# Patient Record
Sex: MALE | Race: WHITE | NOT HISPANIC OR LATINO | Employment: FULL TIME | URBAN - METROPOLITAN AREA
[De-identification: names, ages, dates, MRNs, and addresses within clinical notes are randomized per-mention and may not be internally consistent; named-entity substitution may affect disease eponyms.]

---

## 2018-04-29 ENCOUNTER — OFFICE VISIT (OUTPATIENT)
Dept: URGENT CARE | Facility: CLINIC | Age: 38
End: 2018-04-29

## 2018-04-29 VITALS
SYSTOLIC BLOOD PRESSURE: 122 MMHG | DIASTOLIC BLOOD PRESSURE: 84 MMHG | BODY MASS INDEX: 38.08 KG/M2 | RESPIRATION RATE: 18 BRPM | HEART RATE: 65 BPM | WEIGHT: 266 LBS | OXYGEN SATURATION: 96 % | TEMPERATURE: 97.7 F | HEIGHT: 70 IN
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
## 2018-04-29 PROCEDURE — G0382 LEV 3 HOSP TYPE B ED VISIT: HCPCS | Performed by: PHYSICIAN ASSISTANT

## 2018-04-29 RX ORDER — DOXYCYCLINE 100 MG/1
200 TABLET ORAL ONCE
Qty: 2 TABLET | Refills: 0 | Status: SHIPPED | OUTPATIENT
Start: 2018-04-29 | End: 2018-04-29

## 2018-04-29 NOTE — PROGRESS NOTES
[address] [facility]        NAME: Myrna Strickland is a 44 y o  male  : 1980    MRN: [id number]  DATE: 2018  TIME: 2:54 PM    Assessment and Plan   Tick bite, initial encounter [W57  XXXA]  1  Tick bite, initial encounter  doxycycline (ADOXA) 100 MG tablet         Patient Instructions     1  Tick bite  -Tick was removed prior to arrival  -Take doxycyline as directed for prevention of Lyme disease  -Follow-up with PCP within 1 week or if develop rash, fever, joint pain, headache, or any new concerns    Chief Complaint     Chief Complaint   Patient presents with    Tick Removal     x 1 day, small red area         History of Present Illness       The patient presents today for an evaluation of a tick bite  The patient feels that it bit him yesterday while working in the yard  The patient states that he noticed the tick this morning and the patient states that his wife was able to remove the entire tick  The patient states that the area is "sore "        Review of Systems   Review of Systems   Constitutional: Negative for fever  Skin: Positive for wound  Neurological: Negative for numbness  Current Medications       Current Outpatient Prescriptions:     doxycycline (ADOXA) 100 MG tablet, Take 2 tablets (200 mg total) by mouth once for 1 dose, Disp: 2 tablet, Rfl: 0    Current Allergies     Allergies as of 2018 - Reviewed 2018   Allergen Reaction Noted    Penicillins  2013            The following portions of the patient's history were reviewed and updated as appropriate: allergies, current medications, past family history, past medical history, past social history, past surgical history and problem list      History reviewed  No pertinent past medical history  History reviewed  No pertinent surgical history      Family History   Problem Relation Age of Onset    No Known Problems Mother     No Known Problems Father          Medications have been verified  Objective   /84 (BP Location: Left arm, Patient Position: Sitting, Cuff Size: Large)   Pulse 65   Temp 97 7 °F (36 5 °C) (Tympanic)   Resp 18   Ht 5' 10" (1 778 m)   Wt 121 kg (266 lb)   SpO2 96%   BMI 38 17 kg/m²        Physical Exam     Physical Exam   Constitutional: He is oriented to person, place, and time  He appears well-developed and well-nourished  No distress  Cardiovascular: Normal rate, regular rhythm and normal heart sounds  Pulmonary/Chest: Effort normal and breath sounds normal  He has no wheezes  He has no rales  Neurological: He is alert and oriented to person, place, and time  Skin: Skin is warm and dry  Nursing note and vitals reviewed

## 2018-04-29 NOTE — PATIENT INSTRUCTIONS
1  Tick bite  -Tick was removed prior to arrival  -Take doxycyline as directed for prevention of Lyme disease  -Follow-up with PCP within 1 week or if develop rash, fever, joint pain, headache, or any new concerns    Tick Bite   AMBULATORY CARE:   What you need to know about a tick bite:  Most tick bites are not dangerous, but ticks can pass disease or infection when they bite  Ticks need to be removed quickly  You may have redness, pain, itching, and swelling near the bite  Blisters may also develop  Seek care immediately if:   · You have trouble walking or moving your legs  · You have joint pain, muscle pain, or muscle weakness within 1 month of a tick bite  · You have a fever, chills, headache, or rash  Contact your healthcare provider if:   · You cannot remove the tick  · The tick's head is stuck in your skin  · You have questions or concerns about your condition or care  Treatment for a tick bite:   · Apply ice  on your bite for 15 to 20 minutes every hour or as directed  Use an ice pack, or put crushed ice in a plastic bag  Cover it with a towel before you apply it to your skin  Ice helps prevent tissue damage and decreases swelling and pain  · Medicines  help decrease pain, redness, itching, and swelling  You may also need medicine to prevent or fight a bacterial infection  These medicines may be given as a cream, lotion, or pill  How to remove a tick:  Remove the tick as soon as possible to help prevent disease or infection  You are less likely to get sick from a tick bite if you remove the tick within 24 hours  Do not use petroleum jelly, nail polish, rubbing alcohol, or heat  These do not work and may be dangerous  Do the following to remove a tick:  · First, try a soapy cotton ball  Soak a cotton ball in liquid soap  Cover the tick with the cotton ball for 30 seconds  The tick may come off with the cotton ball when you pull it away      · Use tweezers if the soapy cotton ball does not work  Raphael Murillo the tick as close to your skin as possible  Pull the tick straight up and out  Do not touch the tick with your bare hands  · Do not twist or jerk the tick suddenly, because this may break off the tick's head or mouth parts  Do not leave any part of the tick in your skin  · Do not crush or squeeze the tick since its body may be infected with germs  Flush the tick down the toilet  · After the tick is removed, clean the area of the bite with rubbing alcohol  Then wash your hands with soap and water  Prevent a tick bite:  Ticks live in areas covered by brush and grass  They may even be found in your lawn if you live in certain areas  Outdoor pets can carry ticks inside the house  Ticks can grab onto you or your clothes when you walk by grass or brush  If you go into areas that contain many trees, tall grasses, and underbrush, do the following:  · Wear light colored pants and a long-sleeved shirt  Tuck your pants into your socks or boots  Tuck in your shirt  Wear sleeves that fit close to the skin at your wrists and neck  This will help prevent ticks from crawling through gaps in your clothing and onto your skin  Wear a hat in areas with trees  · Apply insect repellant on your skin  The insect repellant should contain DEET  Do not put insect repellant on skin that is cut, scratched, or irritated  Always use soap and water to wash the insect repellant off as soon as possible once you are indoors  Do not apply insect repellant on your child's face or hands  · Spray insect repellant onto your clothes  Use permethrin spray  This spray kills ticks that crawl on your clothing  Be sure to spray the tops of your boots, bottom of pant legs, and sleeve cuffs  As soon as possible, wash and dry clothing in hot water and high heat  · Check your clothing, hair, and skin for ticks  Shower within 2 hours of coming indoors   Carefully check the hairline, armpits, neck, and waist  Check your pets and children for ticks  Remove ticks from pets the same way as you remove them from people  · Decrease the risk for ticks in your yard  Ticks like to live in shady, moist areas  Sommer Rey your lawn regularly to keep the grass short  Trim the grass around birdbaths and fences  Cut branches that are overgrown and take them out of the yard  Clear out leaf piles  Durenda Loser firewood in a dry, kevin area  Follow up with your healthcare provider as directed:  Write down your questions so you remember to ask them during your visits  © 2017 [address]  Information is for End User's use only and may not be sold, redistributed or otherwise used for commercial purposes  All illustrations and images included in CareNotes® are the copyrighted property of A D A M , Inc  or [employer] Singh  The above information is an  only  It is not intended as medical advice for individual conditions or treatments  Talk to your doctor, nurse or pharmacist before following any medical regimen to see if it is safe and effective for you

## 2018-12-02 ENCOUNTER — TELEPHONE (OUTPATIENT)
Dept: OTHER | Facility: OTHER | Age: 38
End: 2018-12-02

## 2018-12-02 NOTE — TELEPHONE ENCOUNTER
Jackie Pop 1980  CONFIDENTIALTY NOTICE: This fax transmission is intended only for the addressee  It contains information that is legally privileged,  confidential or otherwise protected from use or disclosure  If you are not the intended recipient, you are strictly prohibited from reviewing,  disclosing, copying using or disseminating any of this information or taking any action in reliance on or regarding this information  If you have  received this fax in error, please notify us immediately by telephone so that we can arrange for its return to us  Page: 1  2  Call Id: [id number]  Health Call  Standard Call Report  Health Call  Patient Name: Jackie Pop  Gender: Male  : 1980  Age: 44 Y 6 M 22 D  Return Phone  Number: [phone] (Home)  Address: [place] [address]  City/State/[facility]: [facility] [address]  Practice Name: [facility]  Practice Charged:  Physician:  [address] [place] [address] Name:  Relationship To  Patient: Self  Return Phone Number: [phone] (Home)  Presenting Problem: "I have had a cough for the last 4-5  days "  Service Type: Triage  Charged Service 1: Suyapa Fry U  38  Name and  Number:  Nurse Assessment  Nurse: Tina Harper RN, [facility] Date/Time: 2018 12:40:18 PM  Type of assessment required:  ---General (Adult or Child)  Duration of Current S/S  ---For the last 4-5 days  Location/Radiation  ---Chest  Temperature (F) and route:  ---Denies fever  Symptom Specific Meds (Dose/Time):  ---None  Other S/S  ---Dry cough  No runny nose  No difficulty breathing  Pain Scale on scale of 1-10, 10 being the worst:  ---Denies pain  Symptom progression:  ---same  Intake and Output  ---Drinking normally / WNL  Last Exam/Treatment:  Jackie Pop 1980  CONFIDENTIALTY NOTICE: This fax transmission is intended only for the addressee  It contains information that is legally privileged,  confidential or otherwise protected from use or disclosure   If you are not the intended recipient, you are strictly prohibited from reviewing,  disclosing, copying using or disseminating any of this information or taking any action in reliance on or regarding this information  If you have  received this fax in error, please notify us immediately by telephone so that we can arrange for its return to us  Page: 2 of 2  Call Id: [id number]  Nurse Assessment  ---Seen two months ago at the office for bronchitis  Protocols  Protocol Title Nurse Date/Time  Cough - Acute Non-Productive ZION Gaffney, Ritika Devine 12/2/2018 12:45:10 PM  Question Caller Affirmed  Disp  Time Disposition Final User  12/2/2018 12:53:54 [id number]  Jadiel Whitfield RN, Ritika Devine  12/2/2018 12:54:29 PM RN Triaged Yes Yazmin Hernandez RN, [facility] Advice Given Per Protocol  HOME CARE: You should be able to treat this at home  REASSURANCE: * Coughing is the way that our lungs remove irritants and  mucus  It helps protect our lungs from getting pneumonia  * You can get a dry hacking cough after a chest cold  Sometimes this type  of cough can last 1-3 weeks, and be worse at night  * You can also get a cough after being exposed to irritating substances like smoke,  strong perfumes, and dust  COUGH DROPS FOR COUGH: * Cough drops can help a lot, especially for mild coughs  They reduce  coughing by soothing your irritated throat and removing that tickle sensation in the back of the throat  * Cough drops also have the  advantage of portability - you can carry them with you  * Cough drops are available over-the-counter (OTC)  HOME REMEDY - HARD  CANDY: Hard candy works just as well as a medicine-flavored OTC cough drops  COUGH MEDICINES: * OTC COUGH SYRUPS:  The most common cough suppressant in OTC cough medications is dextromethorphan  Often the letters 'DM' appear in the name  * OTC  COUGH DROPS: Cough drops can help a lot, especially for mild coughs  They reduce coughing by soothing your irritated throat and  removing that tickle sensation in the back of the throat  Cough drops also have the advantage of portability - you can carry them with you  * HOME REMEDY - HARD CANDY: Hard candy works just as well as medicine-flavored OTC cough drops  People who have diabetes  should use sugar-free candy  * HOME REMEDY - HONEY: This old home remedy has been shown to help decrease coughing at night  The adult dosage is 2 teaspoons (10 ml) at bedtime  Honey should not be given to infants under one year of age  HUMIDIFIER: If the air  is dry, use a humidifier in the bedroom  (Reason: dry air makes coughs worse) AVOID TOBACCO SMOKE: Smoking or being exposed  to smoke makes coughs much worse  COUGHING SPELLS: * Drink warm fluids  Inhale warm mist  (Reason: both relax the airway and  loosen up the phlegm) * Suck on cough drops or hard candy to coat the irritated throat  CALL BACK IF: * Cough lasts over 3 weeks *  Continuous coughing persists over 2 hours after cough treatment * Fever over 103 F (39 4 C) * Fever lasts more than 3 days * Difficulty  breathing occurs * You become worse  CARE ADVICE given per Cough - Acute Non-Productive (Adult) guideline  Caller Understands: Yes  Caller Disagree/Comply: Comply  PreDisposition: Unsure  Comments  User: Deepak Vega RN Date/Time: 12/2/2018 12:55:24 PM  Patient would like to know if he can have a refill on his albuterol inhaler

## 2018-12-17 ENCOUNTER — TELEPHONE (OUTPATIENT)
Dept: FAMILY MEDICINE CLINIC | Facility: CLINIC | Age: 38
End: 2018-12-17

## 2018-12-17 DIAGNOSIS — T78.40XA ALLERGIC STATE, INITIAL ENCOUNTER: ICD-10-CM

## 2018-12-17 DIAGNOSIS — J45.909 ASTHMA, UNSPECIFIED ASTHMA SEVERITY, UNSPECIFIED WHETHER COMPLICATED, UNSPECIFIED WHETHER PERSISTENT: Primary | ICD-10-CM

## 2018-12-17 NOTE — TELEPHONE ENCOUNTER
Refer patient to [facility] ambulatory referral for Pulmonary Medicine  Additionally the patient can have lab testing for allergen panel [address] and basic foods    After these results come back we can discuss with him at an appointment future allergy testing or an allergist

## 2018-12-17 NOTE — TELEPHONE ENCOUNTER
Placed referral to pulmonologist and ordered allergen panels, L/m for patient with details and to call back if he has questions

## 2018-12-17 NOTE — TELEPHONE ENCOUNTER
Looking for a recommendation to get allergy testing done  His triggers for his asthma are getting more frequent  HE would also be interested in an asthma specialist if there is someone he can see         Thank you

## 2019-05-07 ENCOUNTER — APPOINTMENT (OUTPATIENT)
Dept: LAB | Facility: CLINIC | Age: 39
End: 2019-05-07
Payer: COMMERCIAL

## 2019-05-07 DIAGNOSIS — T78.40XA ALLERGIC STATE, INITIAL ENCOUNTER: ICD-10-CM

## 2019-05-07 PROCEDURE — 86008 ALLG SPEC IGE RECOMB EA: CPT

## 2019-05-07 PROCEDURE — 82785 ASSAY OF IGE: CPT

## 2019-05-07 PROCEDURE — 86003 ALLG SPEC IGE CRUDE XTRC EA: CPT

## 2019-05-07 PROCEDURE — 36415 COLL VENOUS BLD VENIPUNCTURE: CPT

## 2019-05-08 LAB
A ALTERNATA IGE QN: 0.37 KUA/I
A FUMIGATUS IGE QN: 0.34 KUA/I
A-LACTALB IGE QN: <0.1 KAU/I
ALLERGEN COMMENT: ABNORMAL
ALLERGEN COMMENT: ABNORMAL
ALMOND IGE QN: <0.1 KUA/I
B-LACTOGLOB IGE QN: 0.11 KAU/I
BERMUDA GRASS IGE QN: 0.12 KUA/I
BOXELDER IGE QN: 1.86 KUA/I
C HERBARUM IGE QN: 0.16 KUA/I
CASEIN IGE QN: 0.5 KAU/I
CASHEW NUT IGE QN: 0.39 KUA/I
CAT DANDER IGE QN: 4.1 KUA/I
CMN PIGWEED IGE QN: 0.12 KUA/I
CODFISH IGE QN: <0.1 KUA/I
COMMON RAGWEED IGE QN: 0.13 KUA/I
COTTONWOOD IGE QN: 0.19 KUA/I
D FARINAE IGE QN: 17.5 KUA/I
D PTERONYSS IGE QN: 3.67 KUA/I
DOG DANDER IGE QN: 55.5 KUA/I
EGG WHITE IGE QN: 0.11 KUA/I
GLUTEN IGE QN: 0.13 KUA/I
HAZELNUT IGE QN: 0.14 KUA/L
LONDON PLANE IGE QN: <0.1 KUA/I
MILK IGE QN: 0.25 KUA/I
MOUSE URINE PROT IGE QN: <0.1 KUA/I
MT JUNIPER IGE QN: 0.98 KUA/I
MUGWORT IGE QN: 0.12 KUA/I
OVALB IGE QN: <0.1 KAU/I
OVOMUCOID IGE QN: <0.1 KAU/I
P NOTATUM IGE QN: 16.6 KUA/I
PEANUT (RARA H) 1 IGE QN: <0.1 KUA/I
PEANUT (RARA H) 2 IGE QN: <0.1 KUA/I
PEANUT (RARA H) 3 IGE QN: 0.19 KUA/I
PEANUT (RARA H) 8 IGE QN: 0.11 KUA/I
PEANUT (RARA H) 9 IGE QN: <0.1 KUA/I
PEANUT IGE QN: 0.13 KUA/I
ROACH IGE QN: 0.97 KUA/I
SALMON IGE QN: <0.1 KUA/I
SCALLOP IGE QN: 0.14 KUA/L
SESAME SEED IGE QN: 1.72 KUA/I
SHEEP SORREL IGE QN: <0.1 KUA/I
SHRIMP IGE QN: 1.47 KUA/L
SILVER BIRCH IGE QN: <0.1 KUA/I
SOYBEAN IGE QN: 0.17 KUA/I
TIMOTHY IGE QN: 0.17 KUA/I
TOTAL IGE SMQN RAST: 1957 KU/L (ref 0–113)
TOTAL IGE SMQN RAST: 1974 KU/L (ref 0–113)
TUNA IGE QN: <0.1 KUA/I
WALNUT IGE QN: 0.16 KUA/I
WALNUT IGE QN: <0.1 KUA/I
WHEAT IGE QN: 0.4 KUA/I
WHITE ASH IGE QN: <0.1 KUA/I
WHITE ELM IGE QN: 0.12 KUA/I
WHITE MULBERRY IGE QN: <0.1 KUA/I
WHITE OAK IGE QN: 0.12 KUA/I

## 2019-06-11 ENCOUNTER — TELEPHONE (OUTPATIENT)
Dept: FAMILY MEDICINE CLINIC | Facility: CLINIC | Age: 39
End: 2019-06-11

## 2019-06-11 DIAGNOSIS — T78.40XA ALLERGIC STATE, INITIAL ENCOUNTER: Primary | ICD-10-CM

## 2019-07-03 ENCOUNTER — OFFICE VISIT (OUTPATIENT)
Dept: FAMILY MEDICINE CLINIC | Facility: CLINIC | Age: 39
End: 2019-07-03
Payer: COMMERCIAL

## 2019-07-03 VITALS
HEIGHT: 69 IN | BODY MASS INDEX: 30.84 KG/M2 | OXYGEN SATURATION: 95 % | DIASTOLIC BLOOD PRESSURE: 78 MMHG | HEART RATE: 84 BPM | WEIGHT: 208.2 LBS | SYSTOLIC BLOOD PRESSURE: 124 MMHG | TEMPERATURE: 98.8 F

## 2019-07-03 DIAGNOSIS — J45.21 MILD INTERMITTENT ASTHMATIC BRONCHITIS WITH ACUTE EXACERBATION: ICD-10-CM

## 2019-07-03 DIAGNOSIS — J45.21 MILD INTERMITTENT ASTHMA WITH ACUTE EXACERBATION: Primary | ICD-10-CM

## 2019-07-03 PROBLEM — J45.901 ASTHMATIC BRONCHITIS WITH ACUTE EXACERBATION: Status: ACTIVE | Noted: 2019-07-03

## 2019-07-03 PROCEDURE — 99214 OFFICE O/P EST MOD 30 MIN: CPT | Performed by: FAMILY MEDICINE

## 2019-07-03 RX ORDER — AZITHROMYCIN 500 MG/1
500 TABLET, FILM COATED ORAL DAILY
Qty: 5 TABLET | Refills: 1 | Status: SHIPPED | OUTPATIENT
Start: 2019-07-03 | End: 2019-07-08
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
## 2019-07-03 NOTE — PROGRESS NOTES
BMI Counseling: Body mass index is 30 75 kg/m²  Discussed the patient's BMI with him  The BMI is above average  BMI counseling and education was provided to the patient  Nutrition recommendations include decreasing overall calorie intake

## 2019-07-03 NOTE — PATIENT INSTRUCTIONS
Dyspnea   AMBULATORY CARE:   What is dyspnea? Dyspnea is breathing difficulty or discomfort  You may have labored, painful, or shallow breathing  You may feel breathless or short of breath  Dyspnea can occur during rest or with activity  You may have dyspnea for a short time, or it might become chronic  Dyspnea is often a symptom of a disease or condition  An allergic reaction, anxiety, or travel to high altitudes can increase your risk for dyspnea  Your risk is also increased by a lung condition such as asthma, a heart condition such as heart failure, or a nerve condition  Being overweight or smoking cigarettes can also lead to dyspnea  Signs and symptoms that can occur with dyspnea:   · Chest tightness or pain    · Cough or a coarse or high-pitched noise when you breathe    · Pale and sweaty, cool skin    · Confusion and tiredness    · Bluish-gray lips or nails  Seek care immediately if:   · Your signs and symptoms are the same or worse within 24 hours of treatment  · You have shaking chills or a fever over 102°F      · You have new pain, pressure, or tightness in your chest      · You have a new or worse cough or wheezing, or you cough up blood  · You feel like you cannot get enough air  · The skin over your ribs or on your neck sinks in when you breathe  · You have a severe headache with vomiting and abdominal pain  · You feel confused or dizzy  Contact your healthcare provider if:   · You have questions or concerns about your condition or care  Treatment:  You will work with your healthcare provider to treat the condition causing your dyspnea  You may need the following to improve your symptoms:  · Oxygen therapy  may be used to help you breathe easier  You may need oxygen if your blood oxygen level is lower than it should be  · Medicines  may be used to treat the cause of your dyspnea   Medicines may reduce swelling in your airway or decrease extra fluid from around your heart or lungs  Other medicines may be used to decrease anxiety and help you feel calm and relaxed  · Pulmonary rehabilitation  is used to reduce your symptoms while keeping you active  You may learn breathing techniques, muscle strengthening, and how to pace yourself when you are active  Manage long-term dyspnea:   · Create an action plan  You and your healthcare provider can work together to create a plan for how to handle episodes of dyspnea  The plan can include daily activities, treatment changes, and what to do if you have severe breathing problems  · Lean forward on your elbows when you sit  This helps your lungs expand and may make it easier to breathe  · Use pursed-lip breathing any time you feel short of breath  Breathe in through your nose and then slowly breathe out through your mouth with your lips slightly puckered  It should take you twice as long to breathe out as it did to breathe in  · Do not smoke  Nicotine and other chemicals in cigarettes and cigars can cause lung damage and make it harder to breathe  Ask your healthcare provider for information if you currently smoke and need help to quit  E-cigarettes or smokeless tobacco still contain nicotine  Talk to your healthcare provider before you use these products  · Reach or maintain a healthy weight  Your healthcare provider can help you create a safe weight loss plan if you are overweight  · Exercise as directed  Exercise can help your lungs work more easily  Exercise can also help you lose weight if needed  Try to get at least 30 minutes of exercise most days of the week  Your healthcare provider can help you create an exercise plan that is safe for you  Follow up with your healthcare provider or specialist as directed:  Write down your questions so you remember to ask them during your visits    © 2017 [address] Information is for End User's use only and may not be sold, redistributed or otherwise used for commercial purposes  All illustrations and images included in CareNotes® are the copyrighted property of A D A M , Inc  or Tung Singh  The above information is an  only  It is not intended as medical advice for individual conditions or treatments  Talk to your doctor, nurse or pharmacist before following any medical regimen to see if it is safe and effective for you
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
## 2019-08-14 DIAGNOSIS — F17.208 NICOTINE DEPENDENCE WITH OTHER NICOTINE-INDUCED DISORDER, UNSPECIFIED NICOTINE PRODUCT TYPE: Primary | ICD-10-CM

## 2019-08-14 RX ORDER — VARENICLINE TARTRATE 25 MG
KIT ORAL
Qty: 53 TABLET | Refills: 0 | Status: SHIPPED | OUTPATIENT
Start: 2019-08-14 | End: 2019-09-09 | Stop reason: ALTCHOICE

## 2019-09-08 DIAGNOSIS — F17.208 NICOTINE DEPENDENCE WITH OTHER NICOTINE-INDUCED DISORDER, UNSPECIFIED NICOTINE PRODUCT TYPE: ICD-10-CM

## 2019-09-09 ENCOUNTER — TELEPHONE (OUTPATIENT)
Dept: FAMILY MEDICINE CLINIC | Facility: CLINIC | Age: 39
End: 2019-09-09

## 2019-09-09 DIAGNOSIS — F17.210 CIGARETTE NICOTINE DEPENDENCE WITHOUT COMPLICATION: Primary | ICD-10-CM

## 2019-09-09 RX ORDER — VARENICLINE TARTRATE 1 MG/1
1 TABLET, FILM COATED ORAL 2 TIMES DAILY
Qty: 30 TABLET | Refills: 3 | Status: SHIPPED | OUTPATIENT
Start: 2019-09-09 | End: 2019-12-06 | Stop reason: SDUPTHER

## 2019-09-09 RX ORDER — VARENICLINE TARTRATE 25 MG
KIT ORAL
Refills: 0 | OUTPATIENT
Start: 2019-09-09

## 2019-09-09 NOTE — TELEPHONE ENCOUNTER
Medications sent in Chantix 1 mg twice daily with 3 refills continue this medication and follow up with me as scheduled

## 2019-09-09 NOTE — TELEPHONE ENCOUNTER
Contact patient and see if he is using the Chantix he started on this in August with a starter pack if he is continuing this it would be a new prescription for the continuation pack

## 2019-11-07 ENCOUNTER — TELEPHONE (OUTPATIENT)
Dept: FAMILY MEDICINE CLINIC | Facility: CLINIC | Age: 39
End: 2019-11-07

## 2019-11-07 NOTE — TELEPHONE ENCOUNTER
Wife was inquiring about getting her  his allergy shots here at the office now since he has gotten the required amount done by the specialist

## 2019-11-12 NOTE — TELEPHONE ENCOUNTER
Would the patient bring the shots in with them? Or would we have to order the allergy shots?      Just want to see how we would proceed

## 2019-11-26 NOTE — TELEPHONE ENCOUNTER
Spoke to wife and informed them to call back in Jan, due to being under staffed at this time we do not have the staff to give allergy shot

## 2019-12-06 ENCOUNTER — TELEPHONE (OUTPATIENT)
Dept: FAMILY MEDICINE CLINIC | Facility: CLINIC | Age: 39
End: 2019-12-06

## 2019-12-06 DIAGNOSIS — E83.110 HEREDITARY HEMOCHROMATOSIS (HCC): Primary | ICD-10-CM

## 2019-12-06 DIAGNOSIS — F17.210 CIGARETTE NICOTINE DEPENDENCE WITHOUT COMPLICATION: ICD-10-CM

## 2019-12-06 RX ORDER — VARENICLINE TARTRATE 1 MG/1
TABLET, FILM COATED ORAL
Qty: 56 TABLET | Refills: 3 | Status: SHIPPED | OUTPATIENT
Start: 2019-12-06

## 2019-12-06 NOTE — PROGRESS NOTES
Patient and wife called regarding lab orders for S protein deficiency in family and hemochromatosis testing

## 2019-12-13 ENCOUNTER — APPOINTMENT (OUTPATIENT)
Dept: LAB | Facility: CLINIC | Age: 39
End: 2019-12-13
Payer: COMMERCIAL

## 2019-12-13 DIAGNOSIS — E83.110 HEREDITARY HEMOCHROMATOSIS (HCC): ICD-10-CM

## 2019-12-13 LAB
ALBUMIN SERPL BCP-MCNC: 4.5 G/DL (ref 3.5–5)
ALP SERPL-CCNC: 64 U/L (ref 46–116)
ALT SERPL W P-5'-P-CCNC: 39 U/L (ref 12–78)
ANION GAP SERPL CALCULATED.3IONS-SCNC: 7 MMOL/L (ref 4–13)
AST SERPL W P-5'-P-CCNC: 19 U/L (ref 5–45)
BASOPHILS # BLD AUTO: 0.1 THOUSANDS/ΜL (ref 0–0.1)
BASOPHILS NFR BLD AUTO: 1 % (ref 0–1)
BILIRUB SERPL-MCNC: 0.45 MG/DL (ref 0.2–1)
BUN SERPL-MCNC: 22 MG/DL (ref 5–25)
CALCIUM SERPL-MCNC: 9.1 MG/DL (ref 8.3–10.1)
CHLORIDE SERPL-SCNC: 106 MMOL/L (ref 100–108)
CO2 SERPL-SCNC: 25 MMOL/L (ref 21–32)
CREAT SERPL-MCNC: 1.39 MG/DL (ref 0.6–1.3)
EOSINOPHIL # BLD AUTO: 0.3 THOUSAND/ΜL (ref 0–0.61)
EOSINOPHIL NFR BLD AUTO: 3 % (ref 0–6)
ERYTHROCYTE [DISTWIDTH] IN BLOOD BY AUTOMATED COUNT: 12.6 % (ref 11.6–15.1)
FERRITIN SERPL-MCNC: 177 NG/ML (ref 8–388)
GFR SERPL CREATININE-BSD FRML MDRD: 63 ML/MIN/1.73SQ M
GLUCOSE SERPL-MCNC: 88 MG/DL (ref 65–140)
HCT VFR BLD AUTO: 44.5 % (ref 36.5–49.3)
HGB BLD-MCNC: 14.7 G/DL (ref 12–17)
IMM GRANULOCYTES # BLD AUTO: 0.03 THOUSAND/UL (ref 0–0.2)
IMM GRANULOCYTES NFR BLD AUTO: 0 % (ref 0–2)
LYMPHOCYTES # BLD AUTO: 2.93 THOUSANDS/ΜL (ref 0.6–4.47)
LYMPHOCYTES NFR BLD AUTO: 27 % (ref 14–44)
MCH RBC QN AUTO: 29.9 PG (ref 26.8–34.3)
MCHC RBC AUTO-ENTMCNC: 33 G/DL (ref 31.4–37.4)
MCV RBC AUTO: 91 FL (ref 82–98)
MONOCYTES # BLD AUTO: 0.69 THOUSAND/ΜL (ref 0.17–1.22)
MONOCYTES NFR BLD AUTO: 6 % (ref 4–12)
NEUTROPHILS # BLD AUTO: 6.92 THOUSANDS/ΜL (ref 1.85–7.62)
NEUTS SEG NFR BLD AUTO: 63 % (ref 43–75)
NRBC BLD AUTO-RTO: 0 /100 WBCS
PLATELET # BLD AUTO: 297 THOUSANDS/UL (ref 149–390)
PMV BLD AUTO: 9.6 FL (ref 8.9–12.7)
POTASSIUM SERPL-SCNC: 4.3 MMOL/L (ref 3.5–5.3)
PROT SERPL-MCNC: 7.2 G/DL (ref 6.4–8.2)
RBC # BLD AUTO: 4.91 MILLION/UL (ref 3.88–5.62)
SODIUM SERPL-SCNC: 138 MMOL/L (ref 136–145)
WBC # BLD AUTO: 10.97 THOUSAND/UL (ref 4.31–10.16)

## 2019-12-13 PROCEDURE — 86331 IMMUNODIFFUSION OUCHTERLONY: CPT

## 2019-12-13 PROCEDURE — 85306 CLOT INHIBIT PROT S FREE: CPT

## 2019-12-13 PROCEDURE — 85025 COMPLETE CBC W/AUTO DIFF WBC: CPT

## 2019-12-13 PROCEDURE — 82728 ASSAY OF FERRITIN: CPT

## 2019-12-13 PROCEDURE — 80053 COMPREHEN METABOLIC PANEL: CPT

## 2019-12-13 PROCEDURE — 36415 COLL VENOUS BLD VENIPUNCTURE: CPT

## 2019-12-13 PROCEDURE — 85305 CLOT INHIBIT PROT S TOTAL: CPT

## 2019-12-23 LAB — MISCELLANEOUS LAB TEST RESULT: NORMAL

## 2020-01-03 ENCOUNTER — TELEPHONE (OUTPATIENT)
Dept: FAMILY MEDICINE CLINIC | Facility: CLINIC | Age: 40
End: 2020-01-03

## 2020-01-03 NOTE — TELEPHONE ENCOUNTER
This is something that can be done by the ancillary staff at the office the medical assistant can do this if we get the instructions through the allergist   It has been done before at my office    Wait to confirm this with the instructions from the allergist as well as discussion with our office management protocols

## 2020-01-03 NOTE — TELEPHONE ENCOUNTER
Fern called and asked if you are willing to administer Alphonso's allergy shot here in the office  She is going to speak to the allergist to find out how and if she can go about it

## 2020-01-10 ENCOUNTER — OFFICE VISIT (OUTPATIENT)
Dept: FAMILY MEDICINE CLINIC | Facility: CLINIC | Age: 40
End: 2020-01-10
Payer: COMMERCIAL

## 2020-01-10 VITALS
DIASTOLIC BLOOD PRESSURE: 78 MMHG | HEIGHT: 69 IN | BODY MASS INDEX: 30.27 KG/M2 | SYSTOLIC BLOOD PRESSURE: 124 MMHG | OXYGEN SATURATION: 96 % | HEART RATE: 55 BPM | WEIGHT: 204.4 LBS

## 2020-01-10 DIAGNOSIS — J45.21 MILD INTERMITTENT ASTHMA WITH ACUTE EXACERBATION: ICD-10-CM

## 2020-01-10 DIAGNOSIS — D68.59 PROTEIN S DEFICIENCY (HCC): Primary | ICD-10-CM

## 2020-01-10 PROCEDURE — 99214 OFFICE O/P EST MOD 30 MIN: CPT | Performed by: FAMILY MEDICINE

## 2020-01-10 RX ORDER — ALBUTEROL SULFATE 90 UG/1
AEROSOL, METERED RESPIRATORY (INHALATION)
COMMUNITY
Start: 2015-01-15 | End: 2022-03-11 | Stop reason: SDUPTHER

## 2020-01-10 NOTE — PROGRESS NOTES
Assessment/Plan:       Problem List Items Addressed This Visit        Respiratory    Mild intermittent asthma with acute exacerbation       Mild intermittent asthma with allergies to dog dander is the patient has a large rotweiller and has been going through allergy injections which have been helping  He is  Asking for allergy shots to be done here as he cannot make it to the allergist on his day off from work the allergist is not in which is on Friday  The allergist can only see him on Mondays and he is 45 minutes away  Patient has mild wheezing at this time and will be given a Breo inhaler to continue to use 200 mg dose at this time if he feels that he needs this continuously a prescription can be sent in for him  He will look into the allergy shots and speak to the allergist about having the shots given here at the office in the future  We had a long discussion about this he  Has been off of allergy shots now for the past month and may need to readjust the dosing  If he is to have the shots here in the future he needs to bring his EpiPen and I will give the injection to help him  Full instructions will be needed and provided by the allergist prior to initiation of immunotherapy here         Relevant Medications    albuterol (PROVENTIL HFA,VENTOLIN HFA) 90 mcg/act inhaler    VENTOLIN  (90 Base) MCG/ACT inhaler       Hematopoietic and Hemostatic    Protein S deficiency ([address] ) - Primary       Questionable protein S deficiency a protein as profile was done and showed too low levels within the profile  I will ask him to see Hematology to further clarify this and if there is any specific precautions or treatment plan he needs to follow otherwise additional laboratory work can be ordered through Hematology         Relevant Orders    Ambulatory referral to Hematology / Oncology            Subjective:      Patient ID: Justus Sanches is a 35 y o  male       Patient presents today to review lab work for protein S deficiency hemochromatosis which is in the family and he additionally has longstanding allergies and lung issues creating asthma as a result of dog allergies and he has a large dog at home  He has been seeing an allergist and would like to start getting his allergy shots here at this office as it is difficult for him to get into the allergist because it is only schedule during his work days and he cannot get there on his day off the allergist is not available  The following portions of the patient's history were reviewed and updated as appropriate: allergies, current medications, past family history, past medical history, past social history, past surgical history and problem list     Review of Systems   Constitutional: Negative for chills, fatigue and fever  HENT: Positive for rhinorrhea and sore throat  Negative for congestion, nosebleeds and sinus pressure  Eyes: Negative for discharge and redness  Respiratory: Positive for cough and stridor  Negative for shortness of breath  Cardiovascular: Negative for chest pain, palpitations and leg swelling  Gastrointestinal: Negative for abdominal pain, blood in stool and nausea  Endocrine: Negative for cold intolerance, heat intolerance and polyuria  Genitourinary: Negative for dysuria and frequency  Musculoskeletal: Negative for arthralgias, back pain and myalgias  Skin: Negative for rash  Neurological: Negative for dizziness, weakness and headaches  Hematological: Negative for adenopathy  Psychiatric/Behavioral: Negative for behavioral problems and sleep disturbance  The patient is not nervous/anxious  Objective:      /78 (BP Location: Left arm, Patient Position: Sitting)   Pulse 55   Ht 5' 9" (1 753 m)   Wt 92 7 kg (204 lb 6 4 oz)   SpO2 96%   BMI 30 18 kg/m²        Physical Exam   Constitutional: He is oriented to person, place, and time  He appears well-developed and well-nourished     HENT:   Head: Normocephalic and atraumatic  Right Ear: External ear normal    Left Ear: External ear normal    Nose: Nose normal    Mouth/Throat: Oropharynx is clear and moist    Eyes: Pupils are equal, round, and reactive to light  Conjunctivae and EOM are normal  No scleral icterus  Neck: Normal range of motion  Neck supple  No JVD present  No thyromegaly present  Cardiovascular: Normal rate, regular rhythm and normal heart sounds  No murmur heard  Pulmonary/Chest: Effort normal  He has wheezes  He has no rales  He exhibits no tenderness  Abdominal: Soft  Bowel sounds are normal  He exhibits no distension and no mass  There is no tenderness  There is no rebound and no guarding  Musculoskeletal: Normal range of motion  He exhibits no edema, tenderness or deformity  Lymphadenopathy:     He has no cervical adenopathy  Neurological: He is alert and oriented to person, place, and time  He has normal reflexes  He displays normal reflexes  No cranial nerve deficit  Skin: Skin is warm and dry  No rash noted  No erythema  Psychiatric: He has a normal mood and affect  His behavior is normal  Judgment and thought content normal    Nursing note and vitals reviewed  Data:    Laboratory Results: I have personally reviewed the pertinent laboratory results/reports   Radiology/Other Diagnostic Testing Results: I have personally reviewed pertinent reports         Lab Results   Component Value Date    WBC 10 97 (H) 12/13/2019    HGB 14 7 12/13/2019    HCT 44 5 12/13/2019    MCV 91 12/13/2019     12/13/2019     Lab Results   Component Value Date    K 4 3 12/13/2019     12/13/2019    CO2 25 12/13/2019    BUN 22 12/13/2019    CREATININE 1 39 (H) 12/13/2019    CALCIUM 9 1 12/13/2019    AST 19 12/13/2019    ALT 39 12/13/2019    ALKPHOS 64 12/13/2019    EGFR 63 12/13/2019     No results found for: CHOLESTEROL  No results found for: HDL  No results found for: LDLCALC  No results found for: TRIG  No results found for: CHOLHDL  No results found for: [id number], TSH  No results found for: HGBA1C  No results found for: PSA    [facility], DO

## 2020-01-10 NOTE — ASSESSMENT & PLAN NOTE
Questionable protein S deficiency a protein as profile was done and showed too low levels within the profile    I will ask him to see Hematology to further clarify this and if there is any specific precautions or treatment plan he needs to follow otherwise additional laboratory work can be ordered through Hematology

## 2020-01-10 NOTE — ASSESSMENT & PLAN NOTE
Mild intermittent asthma with allergies to dog dander is the patient has a large rotweiller and has been going through allergy injections which have been helping  He is  Asking for allergy shots to be done here as he cannot make it to the allergist on his day off from work the allergist is not in which is on Friday  The allergist can only see him on Mondays and he is 45 minutes away  Patient has mild wheezing at this time and will be given a Breo inhaler to continue to use 200 mg dose at this time if he feels that he needs this continuously a prescription can be sent in for him  He will look into the allergy shots and speak to the allergist about having the shots given here at the office in the future  We had a long discussion about this he  Has been off of allergy shots now for the past month and may need to readjust the dosing  If he is to have the shots here in the future he needs to bring his EpiPen and I will give the injection to help him    Full instructions will be needed and provided by the allergist prior to initiation of immunotherapy here

## 2020-01-13 ENCOUNTER — TELEPHONE (OUTPATIENT)
Dept: FAMILY MEDICINE CLINIC | Facility: CLINIC | Age: 40
End: 2020-01-13

## 2020-01-13 NOTE — TELEPHONE ENCOUNTER
Jett Jarrell spoke to the allergist and she said she will bring the allergy injection tomorrow and they will also send any instructions that we would need  If we need any other information we would call them and they'd be happy to speak to them       The allergist number is [phone] [facility]

## 2020-01-13 NOTE — TELEPHONE ENCOUNTER
Called patient and his wife picked up the phone  I spoke with her about scheduling Alphonso's future injections and instructed her to call the allergy office and not only get the injection but to also instruct the office to call us to speak to me about any information regarding the future injections  Furthermore I told her every visit Mi Otto needs to come with an EpiPen and he would need to come in when he can get an appointment with doc only       Patient's wife verbalized understanding and said that she would call back once she had spoken to the allergist

## 2020-01-14 NOTE — TELEPHONE ENCOUNTER
Fern dropped off the injections as well as the instructions (in the MA form bin under "B")   In addition she made note that the 5th injection is not there and needs to be done at the allergist

## 2020-01-15 ENCOUNTER — TELEPHONE (OUTPATIENT)
Dept: SURGICAL ONCOLOGY | Facility: CLINIC | Age: 40
End: 2020-01-15

## 2020-01-15 NOTE — TELEPHONE ENCOUNTER
New Patient Encounter    New Patient Intake Form   Patient Details:  Jazlyn Ramirez  1980  [id number]    Background Information:  [address] starts by opening a telephone encounter and gathering the following information   Who is calling to schedule? If not self, relationship to patient? wife   Referring Provider Dr Samm Le   What is the diagnosis? Protein S Def  Is this diagnosis confirmed Yes   Is there a confirmed diagnosis from a biopsy/tissue reviewed by pathology? n/a   Is there any prior history of Cancer? NA   If yes, please explain    When was the diagnosis? 1/2020   Is patient aware of diagnosis? Yes   Reason for visit? NP DX   Have you had any testing done? If so: when, where? Yes   Are records in [employer]? yes   Was the patient told to bring a disk? no   Scheduling Information:   Preferred Eckert:  Feura Bush     Requesting Specific Provider? [email or web address]   Are there any dates/time the patient cannot be seen? no      Miscellaneous: n/a   After completing the above information, please route to Financial Counselor and the appropriate Nurse Navigator for review

## 2020-01-24 ENCOUNTER — OFFICE VISIT (OUTPATIENT)
Dept: FAMILY MEDICINE CLINIC | Facility: CLINIC | Age: 40
End: 2020-01-24
Payer: COMMERCIAL

## 2020-01-24 VITALS
HEIGHT: 69 IN | BODY MASS INDEX: 30.24 KG/M2 | DIASTOLIC BLOOD PRESSURE: 74 MMHG | OXYGEN SATURATION: 97 % | TEMPERATURE: 99.2 F | WEIGHT: 204.2 LBS | SYSTOLIC BLOOD PRESSURE: 132 MMHG | HEART RATE: 56 BPM

## 2020-01-24 DIAGNOSIS — J45.21 MILD INTERMITTENT ASTHMA WITH ACUTE EXACERBATION: Primary | ICD-10-CM

## 2020-01-24 DIAGNOSIS — T78.40XA ALLERGIC STATE, INITIAL ENCOUNTER: ICD-10-CM

## 2020-01-24 PROCEDURE — 1036F TOBACCO NON-USER: CPT | Performed by: FAMILY MEDICINE

## 2020-01-24 PROCEDURE — 95117 IMMUNOTHERAPY INJECTIONS: CPT | Performed by: FAMILY MEDICINE

## 2020-01-24 PROCEDURE — 99213 OFFICE O/P EST LOW 20 MIN: CPT | Performed by: FAMILY MEDICINE

## 2020-01-24 PROCEDURE — 3008F BODY MASS INDEX DOCD: CPT | Performed by: FAMILY MEDICINE

## 2020-01-24 NOTE — ASSESSMENT & PLAN NOTE
Patient presents today for chronic allergies to multiple factors including dogs and cats as well as grasses and trees he has been to an allergist and has been worked up and is here today for desensitization injection and treatment  He cannot make it to his allergist on Mondays when the allergist is open as the patient is working and came in today for the shots to be provided here   Patient a awaited for 20 minutes and was re-evaluated with vital signs repeated after the injections he has no flare up on his arm at the site it is less than 1 mm at the injection site lungs are clear he will be sent home and follow-up in 1 week

## 2020-01-24 NOTE — PROGRESS NOTES
Assessment/Plan:       Problem List Items Addressed This Visit        Respiratory    Mild intermittent asthma with acute exacerbation - Primary       Other    Allergy      Patient presents today for chronic allergies to multiple factors including dogs and cats as well as grasses and trees he has been to an allergist and has been worked up and is here today for desensitization injection and treatment  He cannot make it to his allergist on Mondays when the allergist is open as the patient is working and came in today for the shots to be provided here  Patient a awaited for 20 minutes and was re-evaluated with vital signs repeated after the injections he has no flare up on his arm at the site it is less than 1 mm at the injection site lungs are clear he will be sent home and follow-up in 1 week                 Subjective:      Patient ID: Rosan Mortimer is a 35 y o  male  Patient presents for initial allergy desensitization after seen extensively worked up by the allergist he was provided the vials and the syringes he presents with his EpiPen and Benadryl in case of reaction  I discussed the case with the allergy office and the patient has not had his injections or shots over the last 5 weeks and he came back at this point to the initiation stage       The following portions of the patient's history were reviewed and updated as appropriate: allergies, current medications, past family history, past medical history, past social history, past surgical history and problem list     Review of Systems   Constitutional: Negative for chills, fatigue and fever  HENT: Negative for congestion, nosebleeds, rhinorrhea, sinus pressure and sore throat  Eyes: Negative for discharge and redness  Respiratory: Negative for cough and shortness of breath  Cardiovascular: Negative for chest pain, palpitations and leg swelling  Gastrointestinal: Negative for abdominal pain, blood in stool and nausea     Endocrine: Negative for cold intolerance, heat intolerance and polyuria  Genitourinary: Negative for dysuria and frequency  Musculoskeletal: Negative for arthralgias, back pain and myalgias  Skin: Negative for rash  Neurological: Negative for dizziness, weakness and headaches  Hematological: Negative for adenopathy  Psychiatric/Behavioral: Negative for behavioral problems and sleep disturbance  The patient is not nervous/anxious  Objective:      /74   Pulse 56   Temp 99 2 °F (37 3 °C)   Ht 5' 9" (1 753 m)   Wt 92 6 kg (204 lb 3 2 oz)   SpO2 97%   BMI 30 16 kg/m²        Physical Exam   Constitutional: He is oriented to person, place, and time  He appears well-developed and well-nourished  HENT:   Head: Normocephalic and atraumatic  Right Ear: External ear normal    Left Ear: External ear normal    Nose: Nose normal    Mouth/Throat: Oropharynx is clear and moist    Eyes: Pupils are equal, round, and reactive to light  Conjunctivae and EOM are normal  No scleral icterus  Neck: Normal range of motion  Neck supple  No JVD present  No thyromegaly present  Cardiovascular: Normal rate, regular rhythm and normal heart sounds  No murmur heard  Pulmonary/Chest: Effort normal and breath sounds normal  He has no wheezes  He has no rales  He exhibits no tenderness  Abdominal: Soft  Bowel sounds are normal  He exhibits no distension and no mass  There is no tenderness  There is no rebound and no guarding  Musculoskeletal: Normal range of motion  He exhibits no edema, tenderness or deformity  Lymphadenopathy:     He has no cervical adenopathy  Neurological: He is alert and oriented to person, place, and time  He has normal reflexes  He displays normal reflexes  No cranial nerve deficit  Skin: Skin is warm and dry  No rash noted  No erythema  Psychiatric: He has a normal mood and affect  His behavior is normal  Judgment and thought content normal    Nursing note and vitals reviewed  Data:    Laboratory Results: I have personally reviewed the pertinent laboratory results/reports   Radiology/Other Diagnostic Testing Results: I have personally reviewed pertinent reports         Lab Results   Component Value Date    WBC 10 97 (H) 12/13/2019    HGB 14 7 12/13/2019    HCT 44 5 12/13/2019    MCV 91 12/13/2019     12/13/2019     Lab Results   Component Value Date    K 4 3 12/13/2019     12/13/2019    CO2 25 12/13/2019    BUN 22 12/13/2019    CREATININE 1 39 (H) 12/13/2019    CALCIUM 9 1 12/13/2019    AST 19 12/13/2019    ALT 39 12/13/2019    ALKPHOS 64 12/13/2019    EGFR 63 12/13/2019     No results found for: CHOLESTEROL  No results found for: HDL  No results found for: LDLCALC  No results found for: TRIG  No results found for: CHOLHDL  No results found for: [id number], TSH  No results found for: HGBA1C  No results found for: PSA    [facility], DO

## 2020-01-31 ENCOUNTER — OFFICE VISIT (OUTPATIENT)
Dept: FAMILY MEDICINE CLINIC | Facility: CLINIC | Age: 40
End: 2020-01-31
Payer: COMMERCIAL

## 2020-01-31 DIAGNOSIS — T78.40XD ALLERGIC STATE, SUBSEQUENT ENCOUNTER: ICD-10-CM

## 2020-01-31 PROCEDURE — RECHECK: Performed by: FAMILY MEDICINE

## 2020-01-31 PROCEDURE — 95115 IMMUNOTHERAPY ONE INJECTION: CPT | Performed by: FAMILY MEDICINE

## 2020-01-31 PROCEDURE — 1036F TOBACCO NON-USER: CPT | Performed by: FAMILY MEDICINE

## 2020-02-07 ENCOUNTER — TELEPHONE (OUTPATIENT)
Dept: FAMILY MEDICINE CLINIC | Facility: CLINIC | Age: 40
End: 2020-02-07

## 2020-02-07 NOTE — TELEPHONE ENCOUNTER
Patient walked in for 8am appt at 1240pm   had 2 patient in office and a meeting with a provider at 115pm  I  reminded patient his appt was a t 8 am and he said he knew that he was busy  I offered to reschedule the patient  For Monday and her refused  Patient said he though he could come whenever    He requested Friday 2/14 so that is when he is scheduled

## 2020-02-07 NOTE — TELEPHONE ENCOUNTER
Called  Allergist at  [phone] Asthma and allergy in [place] to see what happens when patient misses appt for allergy shot   Office was closed  Will call back Monday   Office is closed on [address]

## 2020-02-10 ENCOUNTER — TELEPHONE (OUTPATIENT)
Dept: FAMILY MEDICINE CLINIC | Facility: CLINIC | Age: 40
End: 2020-02-10

## 2020-02-10 NOTE — TELEPHONE ENCOUNTER
Spoke with Allergist injection department  Can get injection any during the day doesn't have to be same wither week  And there is a 3 week elsie period if patient misses appt

## 2020-02-10 NOTE — TELEPHONE ENCOUNTER
Need to clarify the schedule for this patient  He is rescheduled to come in this upcoming Friday  He missed last week and I need to know if allergy office recommends a change in the dosing based on the schedule    Then we should address this with patient as I am just 1 provider here and if I am out he needs to follow-up with the allergy office

## 2020-02-10 NOTE — TELEPHONE ENCOUNTER
Doc please see Dania's note below   Let's sit and talk about this because patient is clearly not compliant

## 2020-02-11 NOTE — TELEPHONE ENCOUNTER
Doc,  Please see Dania's note below  She called the allergist and they stated we just continue with the dose he was supposed to take last week  After the 5 doses are up it is up to you if you wish to continue treating him with this or if he needs to go back to allergist  Patient believes he can just come in whenever he wants which is not accurate

## 2020-02-14 ENCOUNTER — CLINICAL SUPPORT (OUTPATIENT)
Dept: FAMILY MEDICINE CLINIC | Facility: CLINIC | Age: 40
End: 2020-02-14
Payer: COMMERCIAL

## 2020-02-14 DIAGNOSIS — T78.40XD ALLERGIC STATE, SUBSEQUENT ENCOUNTER: ICD-10-CM

## 2020-02-14 PROCEDURE — 1036F TOBACCO NON-USER: CPT | Performed by: FAMILY MEDICINE

## 2020-02-14 PROCEDURE — NC001 PR NO CHARGE: Performed by: FAMILY MEDICINE

## 2020-02-14 PROCEDURE — 95115 IMMUNOTHERAPY ONE INJECTION: CPT | Performed by: FAMILY MEDICINE

## 2020-02-21 ENCOUNTER — CLINICAL SUPPORT (OUTPATIENT)
Dept: FAMILY MEDICINE CLINIC | Facility: CLINIC | Age: 40
End: 2020-02-21
Payer: COMMERCIAL

## 2020-02-21 ENCOUNTER — CONSULT (OUTPATIENT)
Dept: HEMATOLOGY ONCOLOGY | Facility: CLINIC | Age: 40
End: 2020-02-21
Payer: COMMERCIAL

## 2020-02-21 VITALS
BODY MASS INDEX: 29.77 KG/M2 | TEMPERATURE: 98.2 F | HEIGHT: 69 IN | RESPIRATION RATE: 16 BRPM | HEART RATE: 65 BPM | WEIGHT: 201 LBS | OXYGEN SATURATION: 98 % | SYSTOLIC BLOOD PRESSURE: 122 MMHG | DIASTOLIC BLOOD PRESSURE: 70 MMHG

## 2020-02-21 VITALS — OXYGEN SATURATION: 96 % | DIASTOLIC BLOOD PRESSURE: 62 MMHG | SYSTOLIC BLOOD PRESSURE: 126 MMHG | HEART RATE: 70 BPM

## 2020-02-21 DIAGNOSIS — T78.40XD ALLERGIC STATE, SUBSEQUENT ENCOUNTER: ICD-10-CM

## 2020-02-21 DIAGNOSIS — D68.59 PROTEIN S DEFICIENCY (HCC): Primary | ICD-10-CM

## 2020-02-21 DIAGNOSIS — Z83.49 FAMILY HISTORY OF HEMOCHROMATOSIS: ICD-10-CM

## 2020-02-21 PROBLEM — E88.09 PROTEINS SERUM PLASMA LOW: Status: RESOLVED | Noted: 2020-02-21 | Resolved: 2020-02-21

## 2020-02-21 PROBLEM — E88.09 PROTEINS SERUM PLASMA LOW: Status: ACTIVE | Noted: 2020-02-21

## 2020-02-21 PROCEDURE — 95117 IMMUNOTHERAPY INJECTIONS: CPT | Performed by: FAMILY MEDICINE

## 2020-02-21 PROCEDURE — 99204 OFFICE O/P NEW MOD 45 MIN: CPT | Performed by: INTERNAL MEDICINE

## 2020-02-21 PROCEDURE — 1036F TOBACCO NON-USER: CPT | Performed by: INTERNAL MEDICINE

## 2020-02-21 NOTE — PATIENT INSTRUCTIONS
The patient is aware seek medical attention for pain or swelling the legs, chest pain, shortness of breath, excessive fatigue, or if other new problems arise
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
## 2020-03-06 ENCOUNTER — TELEPHONE (OUTPATIENT)
Dept: FAMILY MEDICINE CLINIC | Facility: CLINIC | Age: 40
End: 2020-03-06

## 2020-03-06 ENCOUNTER — CLINICAL SUPPORT (OUTPATIENT)
Dept: FAMILY MEDICINE CLINIC | Facility: CLINIC | Age: 40
End: 2020-03-06
Payer: COMMERCIAL

## 2020-03-06 VITALS
SYSTOLIC BLOOD PRESSURE: 124 MMHG | OXYGEN SATURATION: 98 % | DIASTOLIC BLOOD PRESSURE: 82 MMHG | HEART RATE: 66 BPM | HEIGHT: 69 IN | WEIGHT: 201 LBS | BODY MASS INDEX: 29.77 KG/M2

## 2020-03-06 DIAGNOSIS — J45.21 MILD INTERMITTENT ASTHMA WITH ACUTE EXACERBATION: Primary | ICD-10-CM

## 2020-03-06 DIAGNOSIS — J30.9 ALLERGIC RHINITIS, UNSPECIFIED SEASONALITY, UNSPECIFIED TRIGGER: ICD-10-CM

## 2020-03-06 DIAGNOSIS — T78.40XD ALLERGIC STATE, SUBSEQUENT ENCOUNTER: ICD-10-CM

## 2020-03-06 DIAGNOSIS — J45.21 MILD INTERMITTENT ASTHMA WITH ACUTE EXACERBATION: ICD-10-CM

## 2020-03-06 PROCEDURE — 3008F BODY MASS INDEX DOCD: CPT | Performed by: FAMILY MEDICINE

## 2020-03-06 PROCEDURE — 1036F TOBACCO NON-USER: CPT | Performed by: FAMILY MEDICINE

## 2020-03-06 PROCEDURE — 95117 IMMUNOTHERAPY INJECTIONS: CPT | Performed by: FAMILY MEDICINE

## 2020-03-06 RX ORDER — AZITHROMYCIN 250 MG/1
TABLET, FILM COATED ORAL
Qty: 6 TABLET | Refills: 1 | Status: SHIPPED | OUTPATIENT
Start: 2020-03-06 | End: 2020-03-11

## 2020-03-06 RX ORDER — FLUTICASONE FUROATE AND VILANTEROL 100; 25 UG/1; UG/1
1 POWDER RESPIRATORY (INHALATION) DAILY
Qty: 1 INHALER | Refills: 1 | Status: SHIPPED | OUTPATIENT
Start: 2020-03-06 | End: 2020-10-23 | Stop reason: SDUPTHER

## 2020-03-06 RX ORDER — FLUTICASONE FUROATE AND VILANTEROL 100; 25 UG/1; UG/1
1 POWDER RESPIRATORY (INHALATION) DAILY
Qty: 1 INHALER | Refills: 1 | Status: SHIPPED | OUTPATIENT
Start: 2020-03-06 | End: 2020-03-06 | Stop reason: SDUPTHER

## 2020-03-06 RX ORDER — AZITHROMYCIN 250 MG/1
TABLET, FILM COATED ORAL
Qty: 6 TABLET | Refills: 1 | Status: SHIPPED | OUTPATIENT
Start: 2020-03-06 | End: 2020-03-06 | Stop reason: SDUPTHER

## 2020-03-18 ENCOUNTER — TELEPHONE (OUTPATIENT)
Dept: FAMILY MEDICINE CLINIC | Facility: CLINIC | Age: 40
End: 2020-03-18

## 2020-04-01 ENCOUNTER — TELEMEDICINE (OUTPATIENT)
Dept: FAMILY MEDICINE CLINIC | Facility: CLINIC | Age: 40
End: 2020-04-01
Payer: COMMERCIAL

## 2020-04-01 DIAGNOSIS — S80.261A INSECT BITE OF RIGHT KNEE, INITIAL ENCOUNTER: ICD-10-CM

## 2020-04-01 DIAGNOSIS — J45.21 MILD INTERMITTENT ASTHMATIC BRONCHITIS WITH ACUTE EXACERBATION: Primary | ICD-10-CM
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
## 2020-04-01 PROCEDURE — 99213 OFFICE O/P EST LOW 20 MIN: CPT | Performed by: FAMILY MEDICINE

## 2020-04-01 RX ORDER — DOXYCYCLINE HYCLATE 100 MG
100 TABLET ORAL 2 TIMES DAILY
Qty: 28 TABLET | Refills: 1 | Status: SHIPPED | OUTPATIENT
Start: 2020-04-01 | End: 2020-04-15

## 2020-10-23 DIAGNOSIS — J45.21 MILD INTERMITTENT ASTHMA WITH ACUTE EXACERBATION: ICD-10-CM

## 2020-10-23 RX ORDER — FLUTICASONE FUROATE AND VILANTEROL 100; 25 UG/1; UG/1
1 POWDER RESPIRATORY (INHALATION) DAILY
Qty: 1 INHALER | Refills: 1 | Status: SHIPPED | OUTPATIENT
Start: 2020-10-23 | End: 2020-11-16 | Stop reason: SDUPTHER

## 2020-11-16 DIAGNOSIS — J45.21 MILD INTERMITTENT ASTHMA WITH ACUTE EXACERBATION: ICD-10-CM

## 2020-11-16 RX ORDER — FLUTICASONE FUROATE AND VILANTEROL 100; 25 UG/1; UG/1
1 POWDER RESPIRATORY (INHALATION) DAILY
Qty: 1 INHALER | Refills: 1 | Status: SHIPPED | OUTPATIENT
Start: 2020-11-16 | End: 2020-11-19 | Stop reason: SDUPTHER

## 2020-11-19 DIAGNOSIS — J45.21 MILD INTERMITTENT ASTHMA WITH ACUTE EXACERBATION: ICD-10-CM

## 2020-11-19 RX ORDER — FLUTICASONE FUROATE AND VILANTEROL 100; 25 UG/1; UG/1
1 POWDER RESPIRATORY (INHALATION) DAILY
Qty: 1 INHALER | Refills: 1 | Status: SHIPPED | OUTPATIENT
Start: 2020-11-19 | End: 2020-11-19

## 2020-11-19 RX ORDER — FLUTICASONE FUROATE AND VILANTEROL TRIFENATATE 100; 25 UG/1; UG/1
POWDER RESPIRATORY (INHALATION)
Qty: 1 INHALER | Refills: 1 | Status: SHIPPED | OUTPATIENT
Start: 2020-11-19 | End: 2021-05-14 | Stop reason: SDUPTHER

## 2021-05-14 DIAGNOSIS — J45.21 MILD INTERMITTENT ASTHMA WITH ACUTE EXACERBATION: ICD-10-CM

## 2021-05-14 RX ORDER — FLUTICASONE FUROATE AND VILANTEROL TRIFENATATE 100; 25 UG/1; UG/1
POWDER RESPIRATORY (INHALATION)
Qty: 1 EACH | Refills: 0 | Status: SHIPPED | OUTPATIENT
Start: 2021-05-14 | End: 2021-06-15 | Stop reason: SDUPTHER

## 2021-06-14 ENCOUNTER — OFFICE VISIT (OUTPATIENT)
Dept: FAMILY MEDICINE CLINIC | Facility: CLINIC | Age: 41
End: 2021-06-14
Payer: COMMERCIAL

## 2021-06-14 VITALS
BODY MASS INDEX: 28.29 KG/M2 | DIASTOLIC BLOOD PRESSURE: 80 MMHG | SYSTOLIC BLOOD PRESSURE: 120 MMHG | WEIGHT: 191 LBS | TEMPERATURE: 98.9 F | OXYGEN SATURATION: 97 % | HEIGHT: 69 IN | HEART RATE: 68 BPM

## 2021-06-14 DIAGNOSIS — Z00.00 ANNUAL PHYSICAL EXAM: ICD-10-CM

## 2021-06-14 DIAGNOSIS — R79.89 LOW TESTOSTERONE: Primary | ICD-10-CM

## 2021-06-14 DIAGNOSIS — J45.21 MILD INTERMITTENT ASTHMA WITH ACUTE EXACERBATION: ICD-10-CM

## 2021-06-14 DIAGNOSIS — D68.59 PROTEIN S DEFICIENCY (HCC): ICD-10-CM

## 2021-06-14 PROCEDURE — 3725F SCREEN DEPRESSION PERFORMED: CPT | Performed by: FAMILY MEDICINE

## 2021-06-14 PROCEDURE — 1036F TOBACCO NON-USER: CPT | Performed by: FAMILY MEDICINE

## 2021-06-14 PROCEDURE — 99396 PREV VISIT EST AGE 40-64: CPT | Performed by: FAMILY MEDICINE

## 2021-06-14 PROCEDURE — 3008F BODY MASS INDEX DOCD: CPT | Performed by: FAMILY MEDICINE

## 2021-06-14 NOTE — PROGRESS NOTES
ADULT ANNUAL PHYSICAL  [facility]    NAME: True No  AGE: 38 y o  SEX: male  : 1980     DATE: 2021     Assessment and Plan:     Problem List Items Addressed This Visit        Respiratory    Mild intermittent asthma with acute exacerbation     Continue with albuterol inhaler as directed no change continue with Breo inhaler long-acting         Relevant Medications    Ventolin  (90 Base) MCG/ACT inhaler    Other Relevant Orders    Testosterone, free, total    Growth hormone    Comprehensive metabolic panel    Lipid panel    PSA, total and free       Hematopoietic and Hemostatic    Protein S deficiency ([address] )     Assessed lab work at this time         Relevant Orders    Testosterone, free, total    Growth hormone    Comprehensive metabolic panel    Lipid panel    PSA, total and free       Other    Annual physical exam    Relevant Orders    Comprehensive metabolic panel    Lipid panel    PSA, total and free    Low testosterone - Primary    Relevant Orders    Testosterone, free, total    Growth hormone    Comprehensive metabolic panel    Lipid panel    PSA, total and free          Immunizations and preventive care screenings were discussed with patient today  Appropriate education was printed on patient's after visit summary  Counseling:  Alcohol/drug use: discussed moderation in alcohol intake, the recommendations for healthy alcohol use, and avoidance of illicit drug use  Dental Health: discussed importance of regular tooth brushing, flossing, and dental visits  Injury prevention: discussed safety/seat belts, safety helmets, smoke detectors, carbon dioxide detectors, and smoking near bedding or upholstery  Sexual health: discussed sexually transmitted diseases, partner selection, use of condoms, avoidance of unintended pregnancy, and contraceptive alternatives    · Exercise: the importance of regular exercise/physical activity was discussed  Recommend exercise 3-5 times per week for at least 30 minutes  Return in about 1 year (around 6/14/2022) for Annual physical      Chief Complaint:     Chief Complaint   Patient presents with    Asthma    Physical Exam      History of Present Illness:     Adult Annual Physical   Patient here for a comprehensive physical exam  The patient reports no problems  Diet and Physical Activity  · Diet/Nutrition: well balanced diet  · Exercise: no formal exercise  Depression Screening  PHQ-9 Depression Screening    PHQ-9:   Frequency of the following problems over the past two weeks:      Little interest or pleasure in doing things: 0 - not at all  Feeling down, depressed, or hopeless: 0 - not at all  PHQ-2 Score: 0       General Health  · Sleep: sleeps well  · Hearing: normal - bilateral   · Vision: no vision problems  · Dental: regular dental visits   Health  · Symptoms include: none     Review of Systems:     Review of Systems   Constitutional: Negative for chills, fatigue and fever  HENT: Negative for congestion, nosebleeds, rhinorrhea, sinus pressure and sore throat  Eyes: Negative for discharge and redness  Respiratory: Negative for cough and shortness of breath  Cardiovascular: Negative for chest pain, palpitations and leg swelling  Gastrointestinal: Negative for abdominal pain, blood in stool and nausea  Endocrine: Negative for cold intolerance, heat intolerance and polyuria  Genitourinary: Negative for dysuria and frequency  Musculoskeletal: Negative for arthralgias, back pain and myalgias  Skin: Negative for rash  Neurological: Negative for dizziness, weakness and headaches  Hematological: Negative for adenopathy  Psychiatric/Behavioral: Negative for behavioral problems and sleep disturbance  The patient is not nervous/anxious         Past Medical History:     Past Medical History:   Diagnosis Date    Mild intermittent asthma with acute exacerbation 7/3/2019    Protein S deficiency ([facility] [address] ) 1/10/2020      Past Surgical History:     History reviewed  No pertinent surgical history  Family History:     Family History   Problem Relation Age of Onset    No Known Problems Mother     No Known Problems Father       Social History:     Social History     Socioeconomic History    Marital status: /Civil Union     Spouse name: None    Number of children: None    Years of education: None    Highest education level: None   Occupational History    None   Tobacco Use    Smoking status: Never Smoker    Smokeless tobacco: Never Used   Vaping Use    Vaping Use: Never used   Substance and Sexual Activity    Alcohol use: Yes     Comment: social    Drug use: Not Currently    Sexual activity: None   Other Topics Concern    None   Social History Narrative    None     Social Determinants of Health     Financial Resource Strain:     Difficulty of Paying Living Expenses:    Food Insecurity:     Worried About Running Out of Food in the Last Year:     Ran Out of Food in the Last Year:    Transportation Needs:     Lack of Transportation (Medical):      Lack of Transportation (Non-Medical):    Physical Activity:     Days of Exercise per Week:     Minutes of Exercise per Session:    Stress:     Feeling of Stress :    Social Connections:     Frequency of Communication with Friends and Family:     Frequency of Social Gatherings with Friends and Family:     Attends Islam Services:     Active Member of Clubs or Organizations:     Attends Club or Organization Meetings:     Marital Status:    Intimate Partner Violence:     Fear of Current or Ex-Partner:     Emotionally Abused:     Physically Abused:     Sexually Abused:       Current Medications:     Current Outpatient Medications   Medication Sig Dispense Refill    fluticasone-vilanterol (Breo Ellipta) 100-25 mcg/inh inhaler Inhaled once daily rinse mouth after use 1 each 0    Ventolin HFA 108 (90 Base) MCG/ACT inhaler Inhale 2 puffs every 4 (four) hours as needed for shortness of breath 18 g 3    albuterol (PROVENTIL HFA,VENTOLIN HFA) 90 mcg/act inhaler Inhale      CHANTIX CONTINUING MONTH JIM 1 MG tablet TAKE 1 TABLET BY MOUTH TWICE A DAY (Patient not taking: Reported on 1/10/2020) 56 tablet 3     No current facility-administered medications for this visit  Allergies: Allergies   Allergen Reactions    Penicillins       Physical Exam:     /80 (BP Location: Left arm, Patient Position: Sitting)   Pulse 68   Temp 98 9 °F (37 2 °C)   Ht 5' 9" (1 753 m)   Wt 86 6 kg (191 lb)   SpO2 97%   BMI 28 21 kg/m²     Physical Exam  Vitals and nursing note reviewed  Constitutional:       Appearance: Normal appearance  He is well-developed and normal weight  HENT:      Head: Normocephalic and atraumatic  Right Ear: Tympanic membrane, ear canal and external ear normal       Left Ear: Tympanic membrane, ear canal and external ear normal       Nose: Nose normal       Mouth/Throat:      Mouth: Mucous membranes are moist       Pharynx: Oropharynx is clear  Eyes:      Extraocular Movements: Extraocular movements intact  Conjunctiva/sclera: Conjunctivae normal       Pupils: Pupils are equal, round, and reactive to light  Cardiovascular:      Rate and Rhythm: Normal rate and regular rhythm  Pulses: Normal pulses  Heart sounds: Normal heart sounds  No murmur heard  Pulmonary:      Effort: Pulmonary effort is normal  No respiratory distress  Breath sounds: Normal breath sounds  Abdominal:      Palpations: Abdomen is soft  Tenderness: There is no abdominal tenderness  Musculoskeletal:         General: Normal range of motion  Cervical back: Neck supple  Skin:     General: Skin is warm and dry  Capillary Refill: Capillary refill takes less than 2 seconds  Neurological:      General: No focal deficit present        Mental Status: He is alert and oriented to person, place, and time  Mental status is at baseline  Psychiatric:         Mood and Affect: Mood normal          Behavior: Behavior normal          Thought Content: Thought content normal          Judgment: Judgment normal           BMI Counseling: Body mass index is 28 21 kg/m²  The BMI is above normal  Nutrition recommendations include reducing portion sizes, decreasing overall calorie intake, 3-5 servings of fruits/vegetables daily, reducing fast food intake, consuming healthier snacks, decreasing soda and/or juice intake, moderation in carbohydrate intake, increasing intake of lean protein, reducing intake of saturated fat and trans fat and reducing intake of cholesterol  Exercise recommendations include exercising 3-5 times per week      DO Nadya Wilson

## 2021-06-15 DIAGNOSIS — J45.21 MILD INTERMITTENT ASTHMA WITH ACUTE EXACERBATION: ICD-10-CM

## 2021-06-15 RX ORDER — FLUTICASONE FUROATE AND VILANTEROL TRIFENATATE 100; 25 UG/1; UG/1
POWDER RESPIRATORY (INHALATION)
Qty: 1 EACH | Refills: 0 | Status: SHIPPED | OUTPATIENT
Start: 2021-06-15 | End: 2021-11-24 | Stop reason: DRUGHIGH

## 2021-11-16 DIAGNOSIS — J45.21 MILD INTERMITTENT ASTHMA WITH ACUTE EXACERBATION: ICD-10-CM

## 2021-11-24 DIAGNOSIS — J45.21 MILD INTERMITTENT ASTHMA WITH ACUTE EXACERBATION: ICD-10-CM

## 2021-11-24 DIAGNOSIS — J45.21 MILD INTERMITTENT ASTHMA WITH ACUTE EXACERBATION: Primary | ICD-10-CM

## 2022-01-27 ENCOUNTER — TELEPHONE (OUTPATIENT)
Dept: PULMONOLOGY | Facility: CLINIC | Age: 42
End: 2022-01-27

## 2022-01-27 NOTE — TELEPHONE ENCOUNTER
Called patient to schedule consult  Patient insisted on calling his wife to schedule  Called and left her a message to call us back

## 2022-01-31 ENCOUNTER — TELEPHONE (OUTPATIENT)
Dept: UROLOGY | Facility: MEDICAL CENTER | Age: 42
End: 2022-01-31

## 2022-01-31 NOTE — TELEPHONE ENCOUNTER
I just spoke with administration, this patient can only be scheduled with a physician or Cira Trevino or Rosa Thomas

## 2022-03-11 ENCOUNTER — OFFICE VISIT (OUTPATIENT)
Dept: UROLOGY | Facility: MEDICAL CENTER | Age: 42
End: 2022-03-11
Payer: COMMERCIAL

## 2022-03-11 VITALS
BODY MASS INDEX: 30.92 KG/M2 | HEIGHT: 68 IN | WEIGHT: 204 LBS | SYSTOLIC BLOOD PRESSURE: 110 MMHG | HEART RATE: 60 BPM | DIASTOLIC BLOOD PRESSURE: 80 MMHG

## 2022-03-11 DIAGNOSIS — J45.21 MILD INTERMITTENT ASTHMATIC BRONCHITIS WITH ACUTE EXACERBATION: Primary | ICD-10-CM

## 2022-03-11 DIAGNOSIS — Z30.2 ENCOUNTER FOR VASECTOMY: Primary | ICD-10-CM

## 2022-03-11 PROCEDURE — 3008F BODY MASS INDEX DOCD: CPT | Performed by: UROLOGY

## 2022-03-11 PROCEDURE — 99203 OFFICE O/P NEW LOW 30 MIN: CPT | Performed by: UROLOGY

## 2022-03-11 PROCEDURE — 1036F TOBACCO NON-USER: CPT | Performed by: UROLOGY

## 2022-03-11 RX ORDER — ALBUTEROL SULFATE 90 UG/1
2 AEROSOL, METERED RESPIRATORY (INHALATION) 4 TIMES DAILY
Qty: 18 G | Refills: 1 | Status: SHIPPED | OUTPATIENT
Start: 2022-03-11

## 2022-03-11 RX ORDER — HYDROCODONE BITARTRATE AND ACETAMINOPHEN 5; 325 MG/1; MG/1
TABLET ORAL
Qty: 6 TABLET | Refills: 0 | Status: SHIPPED | OUTPATIENT
Start: 2022-03-11

## 2022-03-11 RX ORDER — ALPRAZOLAM 1 MG/1
TABLET ORAL
Qty: 1 TABLET | Refills: 0 | Status: SHIPPED | OUTPATIENT
Start: 2022-03-11

## 2022-03-11 NOTE — PROGRESS NOTES
HISTORY:        This patient has to children and would like to have a vasectomy  He and his wife or partner are sure they want no more children, and are aware that vasectomy is intended to be a permanent form of sterilization  He has been told and understands the following: We will order a semen analysis to check for sterility after three months, and that he must use protection during that time  No guarantee can be made of permanent sterility, and that failure of the procedure is not common, but can occur  Furthermore, spontaneous reestablishment of the flow sperm is quite rare but is a possible reason for failure of the procedure  Although it is not mandatory, if he wants to request another semen sample at any time in the future, to ensure continued sterility, he can do so, at his decision  Potential complications of the procedure include, but are not limited to:  Excessive bleeding which can cause significant swelling; infections; chronic lingering pain of the testicle; damage to the blood supply of the testicle, which might lead to testicular atrophy  The patient has told me he understands the above statements, and wishes to proceed with scheduling the vasectomy  The following portions of the patient's history were reviewed and updated as appropriate: allergies, current medications, past family history, past medical history, past social history, past surgical history and problem list     Review of Systems   All other systems reviewed and are negative  Objective:     Physical Exam  Constitutional:       General: He is not in acute distress  Appearance: He is well-developed  He is not diaphoretic  HENT:      Head: Normocephalic and atraumatic  Eyes:      General: No scleral icterus  Pulmonary:      Effort: Pulmonary effort is normal    Genitourinary:     Comments: Penis testes normal   Skin:     Coloration: Skin is not pale     Neurological: Mental Status: He is alert and oriented to person, place, and time  Psychiatric:         Behavior: Behavior normal          Thought Content: Thought content normal          Judgment: Judgment normal            No results found for: PSA]  BUN   Date Value Ref Range Status   12/13/2019 22 5 - 25 mg/dL Final     Creatinine   Date Value Ref Range Status   12/13/2019 1 39 (H) 0 60 - 1 30 mg/dL Final     Comment:     Standardized to IDMS reference method     No components found for: CBC      Patient Active Problem List   Diagnosis    Mild intermittent asthma with acute exacerbation    Asthmatic bronchitis with acute exacerbation    Protein S deficiency ([facility] [address] )    Allergy    Family history of hemochromatosis    Insect bite of right knee    Annual physical exam    Low testosterone        Diagnoses and all orders for this visit:    Encounter for vasectomy           Patient ID: Cy Gunderson is a 42 y o  male  Current Outpatient Medications:     albuterol (PROVENTIL HFA,VENTOLIN HFA) 90 mcg/act inhaler, Inhale 2 puffs 4 (four) times a day, Disp: 18 g, Rfl: 1    fluticasone-vilanterol (Breo Ellipta) 200-25 MCG/INH inhaler, Inhale 1 puff daily Rinse mouth after use , Disp: 180 blister, Rfl: 1    Ventolin  (90 Base) MCG/ACT inhaler, Inhale 2 puffs every 4 (four) hours as needed for shortness of breath, Disp: 54 g, Rfl: 1    CHANTIX CONTINUING MONTH JIM 1 MG tablet, TAKE 1 TABLET BY MOUTH TWICE A DAY (Patient not taking: Reported on 1/10/2020), Disp: 56 tablet, Rfl: 3    Past Medical History:   Diagnosis Date    Mild intermittent asthma with acute exacerbation 7/3/2019    Protein S deficiency ([facility] [address] ) 1/10/2020       History reviewed  No pertinent surgical history      Social History

## 2022-06-29 ENCOUNTER — PROCEDURE VISIT (OUTPATIENT)
Dept: UROLOGY | Facility: MEDICAL CENTER | Age: 42
End: 2022-06-29
Payer: COMMERCIAL

## 2022-06-29 VITALS
DIASTOLIC BLOOD PRESSURE: 70 MMHG | BODY MASS INDEX: 29.7 KG/M2 | WEIGHT: 196 LBS | HEART RATE: 60 BPM | OXYGEN SATURATION: 97 % | HEIGHT: 68 IN | SYSTOLIC BLOOD PRESSURE: 110 MMHG

## 2022-06-29 DIAGNOSIS — Z30.2 ENCOUNTER FOR VASECTOMY: Primary | ICD-10-CM

## 2022-06-29 DIAGNOSIS — Z30.2 STERILIZATION: ICD-10-CM

## 2022-06-29 PROCEDURE — 55250 REMOVAL OF SPERM DUCT(S): CPT | Performed by: UROLOGY

## 2022-06-29 PROCEDURE — 88302 TISSUE EXAM BY PATHOLOGIST: CPT | Performed by: PATHOLOGY

## 2022-06-29 NOTE — PROGRESS NOTES
Patient presents for vasectomy  He had a consultation recently, all questions were answered, potential complications of the procedure were discussed  Patient expressed understanding of everything we talked about, and signed informed consent document  Before the procedure today,  I asked the patient if he  had any further questions that were not answered during the vasectomy consult  He had no questions and gave informed consent for the procedure  The patient was placed in lithotomy position and the scrotum was prepped with betadine solution  I ensured he was comfortable in lithotomy position, and sterile drapes were placed  The left scrotum was palpated and vas identified  The left vas was isolated, and 8-10 cc of 1% lidocaine was instilled in the skin above the vas, in the dartos muscle and the tissue around the vas  I checked for sensation and the left side was properly anesthetized  A small incision was made over the vas, and the tissue surrounding the vas was gently dissected with hemostat to further isolate the vas  An allis clamp was placed around the vas, and it was brought up into the incision  A small incision in the tissue immediately surrounding the vas was made,and  a small loop of vas was  from the surrounding tissue with a hemostat  A small segment of vas was removed with scissors  The inner lumen of each end of the cut vas was cauterized with bovie to scar the lumen  Each end of the vas was sealed with a hemoclip, taking care not to place the clip too hard, to prevent the clipped end from sloughing off  I checked for bleeding very carefully, used gentle bovie cautery as needed  I was careful not to disturb the arterial supply to the vas or the testicle  Inspection showed no significant bleeding  The vas and surrounding tissue were gently placed back into the scrotum  The right sided vasectomy was performed in identical fashion as in the above paragraph  After removing the segment of vas, cauterizing the lumen, clipping the ends, I checked carefully for bleeding, and there was none  Each incision was closed with a running suture of 4-0 chromic  There was no significant skin bleeding  A sterile dressing was applied and the patient's underwear held the bandage snugly  He was carefully raised to a sitting position and observed to ensure no orthostasis or dizziness  He was properly recovered from the procedure  I reviewed the post-op instructions again and gave him a copy of the instructions in writing  I again emphasized the importance of using birth control until a negative semen sample was obtained at 3 months  Before that he cannot  be considered infertile  He knows his partner must use birth control until that negative sample  He also knows there are rare cases of spontaneous reversal of the vasectomy, and absolutely no guarantee can be made of lifelong infertility  He knows that he can request another semen sample at anytime in the future if he wants further reassurance, although it is not necessary  He was escorted to the waiting room, and his  knew to take him right home  Before he left the procedure room I reviewed all the post-op instructions with him, and his said he understood them  He knows to keep the bandage on until the next day, and he can shower then  He knows to limit activities for several days and call us immediately if he has any concerns for bleeding, pain, infection, or any complication  The patient tolerated the procedure well and understood all instructions and exited the exam room in good condition

## 2022-08-22 ENCOUNTER — TELEPHONE (OUTPATIENT)
Dept: UROLOGY | Facility: AMBULATORY SURGERY CENTER | Age: 42
End: 2022-08-22

## 2022-08-22 NOTE — TELEPHONE ENCOUNTER
Spoke to pt s/p vasectomy with Dr Lyla Villela on 6/29/22  He states he has a hard lump in right scrotum that causes pain when palpated  Pt given appt with nurse tomorrow in [facility]  Dr Lyla Villela will look in on pt

## 2022-08-22 NOTE — TELEPHONE ENCOUNTER
Patient of Dr Arnaldo Leon  Had vasectomy June 29th of this year  Noticed the lump a couple of weeks ago  Now came back and it hurts  Not taking anything for pain  Wife just called to request appointment for today      Wife can be reached at [phone]

## 2022-08-23 ENCOUNTER — PROCEDURE VISIT (OUTPATIENT)
Dept: UROLOGY | Facility: MEDICAL CENTER | Age: 42
End: 2022-08-23

## 2022-08-23 VITALS
SYSTOLIC BLOOD PRESSURE: 160 MMHG | DIASTOLIC BLOOD PRESSURE: 84 MMHG | BODY MASS INDEX: 30.01 KG/M2 | HEART RATE: 62 BPM | WEIGHT: 198 LBS | HEIGHT: 68 IN

## 2022-08-23 DIAGNOSIS — Z98.52 S/P VASECTOMY: Primary | ICD-10-CM

## 2022-08-23 PROCEDURE — 99024 POSTOP FOLLOW-UP VISIT: CPT

## 2022-08-23 NOTE — PROGRESS NOTES
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  [id number]    Diagnosis:    Chief Complaint     s/p vasectomy          Patient presents for scrotal exam s/p vasectomy on 6/29/22 with Dr Jasmin Burns  Plan:    FU PRN    Assessment:    Pt called stating he has had a lump in his right scrotum since his vasectomy  He states it doesn't bother him all the time--only if he doesn't have sex for a few days  After sex the discomfort goes away  Dr Jasmin Burns examined pt and felt a slight thickening of the epididymis  No evidence of infection, lump or tumor palpated  He advised the pt this was nothing to worry about and the discomfort would dissipate over time  No orders were given and pt does not need a follow up appt  Pt will go for post vas semen analysis  Instruction sheet provided  He will contact the office with any further issues or concerns      Vitals:    08/23/22 0940   BP: 160/84   Pulse: 62   Weight: 89 8 kg (198 lb)   Height: 5' 8" (1 727 m)         Tonny Handley RN

## 2022-12-08 ENCOUNTER — TELEPHONE (OUTPATIENT)
Dept: OTHER | Facility: OTHER | Age: 42
End: 2022-12-08

## 2022-12-08 DIAGNOSIS — Z98.52 STATUS POST VASECTOMY: Primary | ICD-10-CM

## 2022-12-08 NOTE — TELEPHONE ENCOUNTER
I spoke with patients wife and let her know the order is placed in his chart  Provided her with the scheduling number and also the lab number

## 2022-12-08 NOTE — TELEPHONE ENCOUNTER
Patients wife calling; an appt needs to be set up for her or patient to drop off semen sample s/p vasectomy he had in late June 2022  Please call her to discuss; she has been trying to reach office for a while and unable to connect

## 2022-12-08 NOTE — TELEPHONE ENCOUNTER
Per patient's wife phone call, patient needs an order for sperm count   She is requesting a call once the order is in so she can schedule the test

## 2022-12-12 ENCOUNTER — APPOINTMENT (OUTPATIENT)
Dept: LAB | Facility: HOSPITAL | Age: 42
End: 2022-12-12

## 2022-12-12 ENCOUNTER — TELEPHONE (OUTPATIENT)
Dept: UROLOGY | Facility: MEDICAL CENTER | Age: 42
End: 2022-12-12

## 2022-12-12 DIAGNOSIS — Z98.52 STATUS POST VASECTOMY: ICD-10-CM

## 2022-12-12 LAB
DEPRECATED CD4 CELLS/CD8 CELLS BLD: 1.5 ML
SPERM MOTILE SMN QL MICRO: NORMAL

## 2022-12-13 NOTE — TELEPHONE ENCOUNTER
I spoke with patients wife per medical communication consent and let her know this information per Alix Dove

## 2023-08-26 ENCOUNTER — HOSPITAL ENCOUNTER (EMERGENCY)
Facility: HOSPITAL | Age: 43
Discharge: HOME | End: 2023-08-26
Attending: EMERGENCY MEDICINE
Payer: COMMERCIAL

## 2023-08-26 VITALS
HEART RATE: 75 BPM | RESPIRATION RATE: 18 BRPM | WEIGHT: 180 LBS | BODY MASS INDEX: 27.28 KG/M2 | DIASTOLIC BLOOD PRESSURE: 87 MMHG | TEMPERATURE: 98.9 F | SYSTOLIC BLOOD PRESSURE: 153 MMHG | HEIGHT: 68 IN | OXYGEN SATURATION: 98 %

## 2023-08-26 DIAGNOSIS — R45.1 AGITATION: Primary | ICD-10-CM

## 2023-08-26 PROCEDURE — G0427 INPT/ED TELECONSULT70: HCPCS | Performed by: STUDENT IN AN ORGANIZED HEALTH CARE EDUCATION/TRAINING PROGRAM

## 2023-08-26 PROCEDURE — 99282 EMERGENCY DEPT VISIT SF MDM: CPT

## 2023-08-26 PROCEDURE — 99284 EMERGENCY DEPT VISIT MOD MDM: CPT | Performed by: EMERGENCY MEDICINE

## 2023-08-26 RX ORDER — NICOTINE 21 MG/24HR
14 PATCH, TRANSDERMAL 24 HOURS TRANSDERMAL ONCE
Status: DISCONTINUED | OUTPATIENT
Start: 2023-08-26 | End: 2023-08-26 | Stop reason: HOSPADM

## 2023-08-26 RX ADMIN — NICOTINE 14 MG: 14 PATCH, EXTENDED RELEASE TRANSDERMAL at 16:29
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
## 2023-08-26 NOTE — ED NOTES
302 to be denied by Dr Jomar Brown and then CW to fax to [facility].     MIS Kaplan, CIS ll  08/26/23 17:42

## 2023-08-26 NOTE — ED NOTES
CW met with pt and his wife, and informed them of Dr Mosqueda's recommendations re: a safety plan. CW requested that pt's wife remove all firearms from the home, since there is no safe place to secure them within the home. CW discussed this case with Dr Flakita Hunter as well, who does not feel it is necessary for police to be involved re: the pt's firearms, and he may be D/C'ed once wife removes firearms from the home.     MIS Adamson, CIS ll  08/26/23 17:38

## 2023-10-06 ENCOUNTER — OFFICE VISIT (OUTPATIENT)
Dept: FAMILY MEDICINE CLINIC | Facility: CLINIC | Age: 43
End: 2023-10-06
Payer: COMMERCIAL

## 2023-10-06 VITALS
DIASTOLIC BLOOD PRESSURE: 62 MMHG | OXYGEN SATURATION: 96 % | WEIGHT: 194 LBS | BODY MASS INDEX: 29.4 KG/M2 | SYSTOLIC BLOOD PRESSURE: 102 MMHG | HEART RATE: 64 BPM | HEIGHT: 68 IN | RESPIRATION RATE: 20 BRPM | TEMPERATURE: 96.4 F

## 2023-10-06 DIAGNOSIS — J45.21 MILD INTERMITTENT ASTHMATIC BRONCHITIS WITH ACUTE EXACERBATION: ICD-10-CM

## 2023-10-06 DIAGNOSIS — J45.21 MILD INTERMITTENT ASTHMA WITH ACUTE EXACERBATION: ICD-10-CM

## 2023-10-06 PROCEDURE — 99396 PREV VISIT EST AGE 40-64: CPT | Performed by: FAMILY MEDICINE

## 2023-10-06 RX ORDER — FLUTICASONE FUROATE AND VILANTEROL 200; 25 UG/1; UG/1
1 POWDER RESPIRATORY (INHALATION) DAILY
Qty: 60 BLISTER | Refills: 3 | Status: SHIPPED | OUTPATIENT
Start: 2023-10-06 | End: 2023-12-05

## 2023-10-06 RX ORDER — ALBUTEROL SULFATE 90 UG/1
2 AEROSOL, METERED RESPIRATORY (INHALATION) 4 TIMES DAILY
Qty: 18 G | Refills: 1 | Status: SHIPPED | OUTPATIENT
Start: 2023-10-06
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
## 2023-11-09 DIAGNOSIS — J45.21 MILD INTERMITTENT ASTHMA WITH ACUTE EXACERBATION: ICD-10-CM

## 2023-11-09 RX ORDER — FLUTICASONE FUROATE AND VILANTEROL 200; 25 UG/1; UG/1
1 POWDER RESPIRATORY (INHALATION) DAILY
Qty: 60 BLISTER | Refills: 3 | Status: SHIPPED | OUTPATIENT
Start: 2023-11-09 | End: 2024-03-08

## 2023-11-09 NOTE — TELEPHONE ENCOUNTER
Pts wife called asking if we can re-send the Breo inhaler to the pharm   Pharm stated to patient wife that they did not receive the med

## 2024-01-18 DIAGNOSIS — J45.21 MILD INTERMITTENT ASTHMATIC BRONCHITIS WITH ACUTE EXACERBATION: ICD-10-CM

## 2024-01-18 RX ORDER — ALBUTEROL SULFATE 90 UG/1
2 AEROSOL, METERED RESPIRATORY (INHALATION) 4 TIMES DAILY
Qty: 18 G | Refills: 1 | Status: SHIPPED | OUTPATIENT
Start: 2024-01-18

## 2024-06-20 DIAGNOSIS — J45.21 MILD INTERMITTENT ASTHMATIC BRONCHITIS WITH ACUTE EXACERBATION: ICD-10-CM

## 2024-06-20 RX ORDER — ALBUTEROL SULFATE 90 UG/1
2 AEROSOL, METERED RESPIRATORY (INHALATION) 4 TIMES DAILY
Qty: 54 G | Refills: 1 | Status: SHIPPED | OUTPATIENT
Start: 2024-06-20

## 2024-06-20 NOTE — TELEPHONE ENCOUNTER
Reason for call:   [x] Refill   [] Prior Auth  [] Other:     Office:   [x] PCP/Provider - Obed Graf DO   [] Specialty/Provider -     Medication: albuterol (PROVENTIL HFA,VENTOLIN HFA) 90 mcg/act inhaler     Dose/Frequency: Inhale 2 puffs 4 (four) times a day     Quantity: 54 + 1 refill    [id number]     Does the patient have enough for 3 days?   [] Yes   [x] No - Send as HP to POD

## 2024-12-02 ENCOUNTER — TELEPHONE (OUTPATIENT)
Age: 44
End: 2024-12-02

## 2024-12-02 DIAGNOSIS — R79.89 LOW TESTOSTERONE: ICD-10-CM

## 2024-12-02 DIAGNOSIS — Z13.228 SCREENING FOR METABOLIC DISORDER: ICD-10-CM

## 2024-12-02 DIAGNOSIS — Z13.220 SCREENING FOR LIPID DISORDERS: ICD-10-CM

## 2024-12-02 DIAGNOSIS — Z13.0 SCREENING FOR BLOOD DISEASE: ICD-10-CM

## 2024-12-02 DIAGNOSIS — Z13.29 SCREENING FOR THYROID DISORDER: ICD-10-CM

## 2024-12-02 DIAGNOSIS — Z00.00 GENERAL MEDICAL EXAM: Primary | ICD-10-CM

## 2024-12-02 NOTE — TELEPHONE ENCOUNTER
Patient is requesting routine labs  as well as testosterone level lab orders put in before their 12/18  appt. Please advise when orders are placed  Thank you

## 2025-03-11 ENCOUNTER — OFFICE VISIT (OUTPATIENT)
Dept: FAMILY MEDICINE CLINIC | Facility: CLINIC | Age: 45
End: 2025-03-11
Payer: COMMERCIAL

## 2025-03-11 VITALS
TEMPERATURE: 97 F | DIASTOLIC BLOOD PRESSURE: 60 MMHG | HEART RATE: 66 BPM | SYSTOLIC BLOOD PRESSURE: 114 MMHG | BODY MASS INDEX: 29.86 KG/M2 | RESPIRATION RATE: 18 BRPM | HEIGHT: 70 IN | WEIGHT: 208.6 LBS | OXYGEN SATURATION: 97 %

## 2025-03-11 DIAGNOSIS — J45.21 MILD INTERMITTENT ASTHMATIC BRONCHITIS WITH ACUTE EXACERBATION: ICD-10-CM

## 2025-03-11 DIAGNOSIS — L03.031 ONYCHIA OF TOE OF RIGHT FOOT: ICD-10-CM

## 2025-03-11 DIAGNOSIS — D68.59 PROTEIN S DEFICIENCY (HCC): ICD-10-CM

## 2025-03-11 DIAGNOSIS — M54.50 CHRONIC BILATERAL LOW BACK PAIN WITHOUT SCIATICA: Primary | ICD-10-CM

## 2025-03-11 DIAGNOSIS — G89.29 CHRONIC BILATERAL LOW BACK PAIN WITHOUT SCIATICA: Primary | ICD-10-CM

## 2025-03-11 PROCEDURE — 99214 OFFICE O/P EST MOD 30 MIN: CPT | Performed by: NURSE PRACTITIONER

## 2025-03-11 RX ORDER — CICLOPIROX 80 MG/ML
SOLUTION TOPICAL
Qty: 6 ML | Refills: 0 | Status: SHIPPED | OUTPATIENT
Start: 2025-03-11
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
## 2025-03-14 ENCOUNTER — TELEPHONE (OUTPATIENT)
Dept: FAMILY MEDICINE CLINIC | Facility: CLINIC | Age: 45
End: 2025-03-14

## 2025-03-14 ENCOUNTER — TELEPHONE (OUTPATIENT)
Age: 45
End: 2025-03-14

## 2025-03-14 DIAGNOSIS — M25.561 RIGHT KNEE PAIN, UNSPECIFIED CHRONICITY: Primary | ICD-10-CM

## 2025-03-14 DIAGNOSIS — M25.571 RIGHT ANKLE PAIN, UNSPECIFIED CHRONICITY: ICD-10-CM
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
## 2025-03-17 ENCOUNTER — APPOINTMENT (OUTPATIENT)
Dept: LAB | Facility: CLINIC | Age: 45
End: 2025-03-17
Payer: COMMERCIAL

## 2025-03-17 DIAGNOSIS — Z13.228 SCREENING FOR METABOLIC DISORDER: ICD-10-CM

## 2025-03-17 DIAGNOSIS — R79.89 LOW TESTOSTERONE: ICD-10-CM

## 2025-03-17 DIAGNOSIS — Z13.220 SCREENING FOR LIPID DISORDERS: ICD-10-CM

## 2025-03-17 DIAGNOSIS — Z13.0 SCREENING FOR BLOOD DISEASE: ICD-10-CM

## 2025-03-17 DIAGNOSIS — Z00.00 GENERAL MEDICAL EXAM: ICD-10-CM

## 2025-03-17 DIAGNOSIS — Z13.29 SCREENING FOR THYROID DISORDER: ICD-10-CM

## 2025-03-17 PROCEDURE — 84443 ASSAY THYROID STIM HORMONE: CPT

## 2025-03-17 PROCEDURE — 80061 LIPID PANEL: CPT

## 2025-03-17 PROCEDURE — 84402 ASSAY OF FREE TESTOSTERONE: CPT

## 2025-03-17 PROCEDURE — 84403 ASSAY OF TOTAL TESTOSTERONE: CPT

## 2025-03-17 PROCEDURE — 85025 COMPLETE CBC W/AUTO DIFF WBC: CPT

## 2025-03-17 PROCEDURE — 80053 COMPREHEN METABOLIC PANEL: CPT

## 2025-03-17 PROCEDURE — 36415 COLL VENOUS BLD VENIPUNCTURE: CPT

## 2025-03-18 LAB
ALBUMIN SERPL BCG-MCNC: 4.4 G/DL (ref 3.5–5)
ALP SERPL-CCNC: 56 U/L (ref 34–104)
ALT SERPL W P-5'-P-CCNC: 23 U/L (ref 7–52)
ANION GAP SERPL CALCULATED.3IONS-SCNC: 9 MMOL/L (ref 4–13)
AST SERPL W P-5'-P-CCNC: 19 U/L (ref 13–39)
BASOPHILS # BLD AUTO: 0.07 THOUSANDS/ÂΜL (ref 0–0.1)
BASOPHILS NFR BLD AUTO: 1 % (ref 0–1)
BILIRUB SERPL-MCNC: 0.37 MG/DL (ref 0.2–1)
BUN SERPL-MCNC: 20 MG/DL (ref 5–25)
CALCIUM SERPL-MCNC: 9.3 MG/DL (ref 8.4–10.2)
CHLORIDE SERPL-SCNC: 104 MMOL/L (ref 96–108)
CHOLEST SERPL-MCNC: 154 MG/DL (ref ?–200)
CO2 SERPL-SCNC: 25 MMOL/L (ref 21–32)
CREAT SERPL-MCNC: 0.77 MG/DL (ref 0.6–1.3)
EOSINOPHIL # BLD AUTO: 0.25 THOUSAND/ÂΜL (ref 0–0.61)
EOSINOPHIL NFR BLD AUTO: 3 % (ref 0–6)
ERYTHROCYTE [DISTWIDTH] IN BLOOD BY AUTOMATED COUNT: 13.6 % (ref 11.6–15.1)
GFR SERPL CREATININE-BSD FRML MDRD: 109 ML/MIN/1.73SQ M
GLUCOSE P FAST SERPL-MCNC: 90 MG/DL (ref 65–99)
HCT VFR BLD AUTO: 43.9 % (ref 36.5–49.3)
HDLC SERPL-MCNC: 51 MG/DL
HGB BLD-MCNC: 14.6 G/DL (ref 12–17)
IMM GRANULOCYTES # BLD AUTO: 0.04 THOUSAND/UL (ref 0–0.2)
IMM GRANULOCYTES NFR BLD AUTO: 1 % (ref 0–2)
LDLC SERPL CALC-MCNC: 79 MG/DL (ref 0–100)
LYMPHOCYTES # BLD AUTO: 2.22 THOUSANDS/ÂΜL (ref 0.6–4.47)
LYMPHOCYTES NFR BLD AUTO: 30 % (ref 14–44)
MCH RBC QN AUTO: 30.2 PG (ref 26.8–34.3)
MCHC RBC AUTO-ENTMCNC: 33.3 G/DL (ref 31.4–37.4)
MCV RBC AUTO: 91 FL (ref 82–98)
MONOCYTES # BLD AUTO: 0.75 THOUSAND/ÂΜL (ref 0.17–1.22)
MONOCYTES NFR BLD AUTO: 10 % (ref 4–12)
NEUTROPHILS # BLD AUTO: 4.1 THOUSANDS/ÂΜL (ref 1.85–7.62)
NEUTS SEG NFR BLD AUTO: 55 % (ref 43–75)
NRBC BLD AUTO-RTO: 0 /100 WBCS
PLATELET # BLD AUTO: 293 THOUSANDS/UL (ref 149–390)
PMV BLD AUTO: 9.6 FL (ref 8.9–12.7)
POTASSIUM SERPL-SCNC: 4.4 MMOL/L (ref 3.5–5.3)
PROT SERPL-MCNC: 6.8 G/DL (ref 6.4–8.4)
RBC # BLD AUTO: 4.84 MILLION/UL (ref 3.88–5.62)
SODIUM SERPL-SCNC: 138 MMOL/L (ref 135–147)
TRIGL SERPL-MCNC: 119 MG/DL (ref ?–150)
TSH SERPL DL<=0.05 MIU/L-ACNC: 1.8 UIU/ML (ref 0.45–4.5)
WBC # BLD AUTO: 7.43 THOUSAND/UL (ref 4.31–10.16)

## 2025-03-19 LAB
TESTOST FREE SERPL-MCNC: 16.1 PG/ML (ref 6.8–21.5)
TESTOST SERPL-MCNC: 731 NG/DL (ref 264–916)

## 2025-03-26 ENCOUNTER — TELEPHONE (OUTPATIENT)
Age: 45
End: 2025-03-26

## 2025-03-26 NOTE — TELEPHONE ENCOUNTER
Patient made aware of test results   Pt asked if you can call him to discuss Testerone levels  Pt stated that you are aware of his concerns.

## 2025-03-27 NOTE — TELEPHONE ENCOUNTER
Spoke to patient he was made aware of message   He stated he will thinks about and will call back if he wants to make apt with PCP or ifs he interested in the referral to Urology

## 2025-04-01 ENCOUNTER — EVALUATION (OUTPATIENT)
Dept: PHYSICAL THERAPY | Facility: CLINIC | Age: 45
End: 2025-04-01
Payer: COMMERCIAL

## 2025-04-01 DIAGNOSIS — M25.561 RIGHT KNEE PAIN, UNSPECIFIED CHRONICITY: Primary | ICD-10-CM

## 2025-04-01 DIAGNOSIS — M25.571 RIGHT ANKLE PAIN, UNSPECIFIED CHRONICITY: ICD-10-CM

## 2025-04-01 DIAGNOSIS — G89.29 CHRONIC BILATERAL LOW BACK PAIN WITHOUT SCIATICA: ICD-10-CM

## 2025-04-01 DIAGNOSIS — M54.50 CHRONIC BILATERAL LOW BACK PAIN WITHOUT SCIATICA: ICD-10-CM

## 2025-04-01 PROCEDURE — 97110 THERAPEUTIC EXERCISES: CPT | Performed by: PHYSICAL THERAPIST

## 2025-04-01 PROCEDURE — 97112 NEUROMUSCULAR REEDUCATION: CPT | Performed by: PHYSICAL THERAPIST

## 2025-04-01 PROCEDURE — 97163 PT EVAL HIGH COMPLEX 45 MIN: CPT | Performed by: PHYSICAL THERAPIST
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
## 2025-04-22 ENCOUNTER — OFFICE VISIT (OUTPATIENT)
Dept: PHYSICAL THERAPY | Facility: CLINIC | Age: 45
End: 2025-04-22
Payer: COMMERCIAL

## 2025-04-22 DIAGNOSIS — M25.571 RIGHT ANKLE PAIN, UNSPECIFIED CHRONICITY: ICD-10-CM

## 2025-04-22 DIAGNOSIS — M54.50 CHRONIC BILATERAL LOW BACK PAIN WITHOUT SCIATICA: ICD-10-CM

## 2025-04-22 DIAGNOSIS — G89.29 CHRONIC BILATERAL LOW BACK PAIN WITHOUT SCIATICA: ICD-10-CM

## 2025-04-22 DIAGNOSIS — M25.561 RIGHT KNEE PAIN, UNSPECIFIED CHRONICITY: Primary | ICD-10-CM

## 2025-04-22 PROCEDURE — 97110 THERAPEUTIC EXERCISES: CPT | Performed by: PHYSICAL THERAPIST

## 2025-04-22 PROCEDURE — 97140 MANUAL THERAPY 1/> REGIONS: CPT | Performed by: PHYSICAL THERAPIST

## 2025-04-22 NOTE — PROGRESS NOTES
"Daily Note     Today's date: 2025  Patient name: Alphonso Bae  : 1980  MRN: [id number]  Referring provider: Marlene Jerome DNP  Dx:   Encounter Diagnosis     ICD-10-CM    1. Right knee pain, unspecified chronicity  M25.561       2. Right ankle pain, unspecified chronicity  M25.571       3. Chronic bilateral low back pain without sciatica  M54.50     G89.29                      Subjective: Ankle feels stiff with dorsiflexion and inversion.  He felt better with the warmer weather in [place].      Objective: See treatment diary below      Assessment: Tolerated treatment well. Patient demonstrated fatigue post treatment and would benefit from continued PT.  New TE added today for core strengthening, as well as, mobilizations to improve lumbar and ankle mobility.  Reports feeling well after session.      Plan: Continue per plan of care.  Progress treatment as tolerated.       Precautions: None    POC expires Unit limit Auth  expiration date PT/OT + Visit Limit?   25 NA 25 30                 Visit/Unit Tracking  AUTH Status:  Date              Authorized Used 1 2              Remaining  29 28                 Manuals            CPA mobs L3-5  Gr 3-4 JF           PA and AP TC jt mobs  JF           PROM Inv/eversion                          Neuro Re-Ed             Pt education Ankle, knee and lumbar anatomy and pathology            Mary Kumar M,L Instructed 33# 3x10           Mary Paloff Press  15# 10\"x10           PPT with march             Supine HL t-ball press             Bridges             Seated BAPS right             SLS right             Bosu Step-ups F/L             Ther Ex             Nustep seat 10 with UE  L7x10'           Matrix Quads             Matrix HS             Prone quad stretch with strap paola             Supine 90/90 HS stretch paola             T-band Ankle ex Instructed            T-band MWM ankle dorsiflexion right  Black 5\"x20                      "   Ther Activity             Lateral                           Gait Training                                       Modalities

## 2025-04-30 ENCOUNTER — APPOINTMENT (OUTPATIENT)
Dept: PHYSICAL THERAPY | Facility: CLINIC | Age: 45
End: 2025-04-30
Attending: NURSE PRACTITIONER
Payer: COMMERCIAL

## 2025-05-01 ENCOUNTER — OFFICE VISIT (OUTPATIENT)
Dept: PHYSICAL THERAPY | Facility: CLINIC | Age: 45
End: 2025-05-01
Attending: NURSE PRACTITIONER
Payer: COMMERCIAL

## 2025-05-01 DIAGNOSIS — G89.29 CHRONIC BILATERAL LOW BACK PAIN WITHOUT SCIATICA: ICD-10-CM

## 2025-05-01 DIAGNOSIS — M25.571 RIGHT ANKLE PAIN, UNSPECIFIED CHRONICITY: ICD-10-CM

## 2025-05-01 DIAGNOSIS — M54.50 CHRONIC BILATERAL LOW BACK PAIN WITHOUT SCIATICA: ICD-10-CM

## 2025-05-01 DIAGNOSIS — M25.561 RIGHT KNEE PAIN, UNSPECIFIED CHRONICITY: Primary | ICD-10-CM

## 2025-05-01 PROCEDURE — 97112 NEUROMUSCULAR REEDUCATION: CPT | Performed by: PHYSICAL THERAPIST

## 2025-05-01 PROCEDURE — 97530 THERAPEUTIC ACTIVITIES: CPT | Performed by: PHYSICAL THERAPIST

## 2025-05-01 PROCEDURE — 97140 MANUAL THERAPY 1/> REGIONS: CPT | Performed by: PHYSICAL THERAPIST

## 2025-05-01 NOTE — PROGRESS NOTES
"Daily Note     Today's date: 2025  Patient name: Alphonso Bae  : 1980  MRN: [id number]  Referring provider: Marlene Jerome DNP  Dx:   Encounter Diagnosis     ICD-10-CM    1. Right knee pain, unspecified chronicity  M25.561       2. Right ankle pain, unspecified chronicity  M25.571       3. Chronic bilateral low back pain without sciatica  M54.50     G89.29                      Subjective: Ankle feels \"more fluid\" going down the stairs.  No changes in back pain.  Shoulder is feeling better.  Has been going to the gym consistently and has started training for boxing again.  Doesn't feel like he is as mobile in the ring as he used to be.      Objective: See treatment diary below      Assessment: Tolerated treatment well. Patient demonstrated fatigue post treatment and would benefit from continued PT      Plan: Continue per plan of care.  Progress treatment as tolerated.       Precautions: None    POC expires Unit limit Auth  expiration date PT/OT + Visit Limit?   25 NA 25 30                 Visit/Unit Tracking  AUTH Status:  Date             Authorized Used 1 2 3             Remaining  29 28 27                Manuals           CPA mobs L3-5  Gr 3-4 JF Gr 3-4 JF          PA and AP TC jt mobs  JF JF          L-spine rocking mobs prone   JF grade III          PROM Inv/eversion   JF          Inv. [place] tilt mobs   JF          Neuro Re-Ed             Pt education Ankle, knee and lumbar anatomy and pathology            [place] Rows M,L Instructed 33# 3x10           [place] Paloff Press  15# 10\"x10           PPT with march   L1 x30          Supine HL t-ball press             Bridges   3\"x30          Seated BAPS right             SLS right             Bosu Step-ups F/L             Ther Ex             Nustep seat 10 with UE  L7x10'           Matrix Quads             Matrix HS             Prone quad stretch with strap paola             Supine 90/90 HS stretch paola             T-band " "Ankle ex Instructed            T-band MWM ankle dorsiflexion right  Black 5\"x20                        Ther Activity             Lateral Step downs   6\" 3x10          Bosu Step-ups   x30          Bkwd lunge on slider   3x10                       Gait Training                                       Modalities                                            "

## 2025-05-02 ENCOUNTER — OFFICE VISIT (OUTPATIENT)
Dept: FAMILY MEDICINE CLINIC | Facility: CLINIC | Age: 45
End: 2025-05-02
Payer: COMMERCIAL

## 2025-05-02 VITALS
DIASTOLIC BLOOD PRESSURE: 60 MMHG | OXYGEN SATURATION: 96 % | WEIGHT: 205.6 LBS | RESPIRATION RATE: 18 BRPM | HEART RATE: 95 BPM | HEIGHT: 70 IN | BODY MASS INDEX: 29.43 KG/M2 | TEMPERATURE: 96.2 F | SYSTOLIC BLOOD PRESSURE: 120 MMHG

## 2025-05-02 DIAGNOSIS — M23.91 INTERNAL DERANGEMENT OF MULTIPLE SITES OF RIGHT KNEE: ICD-10-CM

## 2025-05-02 DIAGNOSIS — G89.29 CHRONIC PAIN OF RIGHT ANKLE: Primary | ICD-10-CM

## 2025-05-02 DIAGNOSIS — M25.571 CHRONIC PAIN OF RIGHT ANKLE: Primary | ICD-10-CM

## 2025-05-02 DIAGNOSIS — M54.16 LUMBAR RADICULOPATHY, CHRONIC: ICD-10-CM

## 2025-05-02 PROCEDURE — 99214 OFFICE O/P EST MOD 30 MIN: CPT | Performed by: FAMILY MEDICINE
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
## 2025-05-09 ENCOUNTER — APPOINTMENT (OUTPATIENT)
Dept: PHYSICAL THERAPY | Facility: CLINIC | Age: 45
End: 2025-05-09
Attending: NURSE PRACTITIONER
Payer: COMMERCIAL

## 2025-05-15 ENCOUNTER — TELEPHONE (OUTPATIENT)
Age: 45
End: 2025-05-15

## 2025-05-15 DIAGNOSIS — M75.81 TENDINITIS OF RIGHT ROTATOR CUFF: Primary | ICD-10-CM

## 2025-05-15 NOTE — TELEPHONE ENCOUNTER
Patient states he has scripts for to have an MRI done on his spine, knee and ankle, however, patient is asking if his right shoulder can be added as well. He states he does go to physical therapy for his right shoulder and would like to have that checked as well.